# Patient Record
Sex: MALE | Race: WHITE | Employment: FULL TIME | URBAN - NONMETROPOLITAN AREA
[De-identification: names, ages, dates, MRNs, and addresses within clinical notes are randomized per-mention and may not be internally consistent; named-entity substitution may affect disease eponyms.]

---

## 2017-10-12 ENCOUNTER — HOSPITAL ENCOUNTER (INPATIENT)
Age: 57
LOS: 3 days | Discharge: HOME | DRG: 603 | End: 2017-10-16
Attending: EMERGENCY MEDICINE | Admitting: FAMILY MEDICINE
Payer: COMMERCIAL

## 2017-10-12 DIAGNOSIS — L03.116 CELLULITIS OF LEFT LOWER EXTREMITY: Primary | ICD-10-CM

## 2017-10-12 DIAGNOSIS — R23.3 PETECHIAE: ICD-10-CM

## 2017-10-12 PROCEDURE — 99284 EMERGENCY DEPT VISIT MOD MDM: CPT

## 2017-10-12 PROCEDURE — 83605 ASSAY OF LACTIC ACID: CPT

## 2017-10-12 PROCEDURE — 87040 BLOOD CULTURE FOR BACTERIA: CPT

## 2017-10-12 PROCEDURE — 85025 COMPLETE CBC W/AUTO DIFF WBC: CPT

## 2017-10-12 PROCEDURE — 80053 COMPREHEN METABOLIC PANEL: CPT

## 2017-10-12 RX ORDER — LIDOCAINE 50 MG/G
OINTMENT TOPICAL DAILY
COMMUNITY

## 2017-10-12 RX ORDER — ASPIRIN 325 MG
325 TABLET ORAL DAILY
Status: ON HOLD | COMMUNITY
End: 2017-10-16

## 2017-10-12 RX ORDER — GABAPENTIN 300 MG/1
300 CAPSULE ORAL NIGHTLY
COMMUNITY

## 2017-10-12 RX ORDER — AMOXICILLIN 500 MG
CAPSULE ORAL DAILY
COMMUNITY

## 2017-10-12 RX ORDER — MULTIVIT WITH MINERALS/LUTEIN
1 TABLET ORAL DAILY
COMMUNITY

## 2017-10-12 RX ORDER — NAPROXEN 500 MG/1
500 TABLET ORAL 2 TIMES DAILY WITH MEALS
Status: ON HOLD | COMMUNITY
End: 2017-10-16 | Stop reason: HOSPADM

## 2017-10-12 RX ORDER — LISINOPRIL 20 MG/1
20 TABLET ORAL DAILY
COMMUNITY

## 2017-10-12 ASSESSMENT — PAIN DESCRIPTION - LOCATION: LOCATION: FOOT

## 2017-10-12 ASSESSMENT — PAIN DESCRIPTION - PAIN TYPE: TYPE: ACUTE PAIN

## 2017-10-12 ASSESSMENT — PAIN SCALES - GENERAL: PAINLEVEL_OUTOF10: 10

## 2017-10-12 ASSESSMENT — PAIN DESCRIPTION - ORIENTATION: ORIENTATION: RIGHT;LEFT

## 2017-10-13 ENCOUNTER — APPOINTMENT (OUTPATIENT)
Dept: VASCULAR LAB | Age: 57
DRG: 603 | End: 2017-10-13
Payer: COMMERCIAL

## 2017-10-13 PROBLEM — G62.9 NEUROPATHY: Chronic | Status: ACTIVE | Noted: 2017-10-13

## 2017-10-13 PROBLEM — B35.3 TINEA PEDIS OF BOTH FEET: Status: ACTIVE | Noted: 2017-10-13

## 2017-10-13 PROBLEM — I10 ESSENTIAL HYPERTENSION: Chronic | Status: ACTIVE | Noted: 2017-10-13

## 2017-10-13 PROBLEM — L03.116 CELLULITIS OF LEFT LOWER LEG: Status: ACTIVE | Noted: 2017-10-13

## 2017-10-13 PROBLEM — R51.9 HEADACHE: Status: ACTIVE | Noted: 2017-10-13

## 2017-10-13 LAB
-: NORMAL
ABSOLUTE BANDS #: 1.46 K/UL (ref 0–1)
ABSOLUTE EOS #: 0.18 K/UL (ref 0–0.4)
ABSOLUTE LYMPH #: 0.73 K/UL (ref 1–4.8)
ABSOLUTE MONO #: 0 K/UL (ref 0–1)
ALBUMIN SERPL-MCNC: 4.2 G/DL (ref 3.5–5.2)
ALBUMIN/GLOBULIN RATIO: 1.5 (ref 1–2.5)
ALP BLD-CCNC: 63 U/L (ref 40–129)
ALT SERPL-CCNC: 48 U/L (ref 5–41)
AMORPHOUS: NORMAL
ANION GAP SERPL CALCULATED.3IONS-SCNC: 12 MMOL/L (ref 9–17)
ANION GAP SERPL CALCULATED.3IONS-SCNC: 14 MMOL/L (ref 9–17)
AST SERPL-CCNC: 29 U/L
BACTERIA: NORMAL
BANDS: 8 %
BASOPHILS # BLD: 1 %
BASOPHILS ABSOLUTE: 0.18 K/UL (ref 0–0.2)
BILIRUB SERPL-MCNC: 0.43 MG/DL (ref 0.3–1.2)
BILIRUBIN URINE: NEGATIVE
BUN BLDV-MCNC: 12 MG/DL (ref 6–20)
BUN BLDV-MCNC: 14 MG/DL (ref 6–20)
BUN/CREAT BLD: 13 (ref 9–20)
BUN/CREAT BLD: 14 (ref 9–20)
CALCIUM SERPL-MCNC: 8.6 MG/DL (ref 8.6–10.4)
CALCIUM SERPL-MCNC: 9 MG/DL (ref 8.6–10.4)
CASTS UA: NORMAL /LPF
CHLORIDE BLD-SCNC: 99 MMOL/L (ref 98–107)
CHLORIDE BLD-SCNC: 99 MMOL/L (ref 98–107)
CO2: 25 MMOL/L (ref 20–31)
CO2: 27 MMOL/L (ref 20–31)
COLOR: YELLOW
COMMENT UA: NORMAL
CREAT SERPL-MCNC: 0.96 MG/DL (ref 0.7–1.2)
CREAT SERPL-MCNC: 1.02 MG/DL (ref 0.7–1.2)
CRYSTALS, UA: NORMAL /HPF
D-DIMER QUANTITATIVE: 0.59 MG/L FEU (ref 0.19–0.5)
DIFFERENTIAL TYPE: ABNORMAL
DIRECT EXAM: NORMAL
EOSINOPHILS RELATIVE PERCENT: 1 %
EPITHELIAL CELLS UA: NORMAL /HPF (ref 0–5)
ESTIMATED AVERAGE GLUCOSE: 126 MG/DL
GFR AFRICAN AMERICAN: >60 ML/MIN
GFR AFRICAN AMERICAN: >60 ML/MIN
GFR NON-AFRICAN AMERICAN: >60 ML/MIN
GFR NON-AFRICAN AMERICAN: >60 ML/MIN
GFR SERPL CREATININE-BSD FRML MDRD: ABNORMAL ML/MIN/{1.73_M2}
GLUCOSE BLD-MCNC: 134 MG/DL (ref 74–100)
GLUCOSE BLD-MCNC: 141 MG/DL (ref 74–100)
GLUCOSE BLD-MCNC: 142 MG/DL (ref 70–99)
GLUCOSE BLD-MCNC: 154 MG/DL (ref 70–99)
GLUCOSE BLD-MCNC: 189 MG/DL (ref 74–100)
GLUCOSE URINE: NEGATIVE
HBA1C MFR BLD: 6 % (ref 4.8–5.9)
HCT VFR BLD CALC: 42.8 % (ref 41–53)
HCT VFR BLD CALC: 45.5 % (ref 41–53)
HEMOGLOBIN: 14.4 G/DL (ref 13.5–17)
HEMOGLOBIN: 15.3 G/DL (ref 13.5–17)
INR BLD: 1.1 (ref 0.9–1.2)
KETONES, URINE: NEGATIVE
LACTIC ACID: 1.7 MMOL/L (ref 0.5–2.2)
LEUKOCYTE ESTERASE, URINE: NEGATIVE
LYMPHOCYTES # BLD: 4 %
Lab: NORMAL
MCH RBC QN AUTO: 31 PG (ref 26–34)
MCH RBC QN AUTO: 31.2 PG (ref 26–34)
MCHC RBC AUTO-ENTMCNC: 33.6 G/DL (ref 31–37)
MCHC RBC AUTO-ENTMCNC: 33.7 G/DL (ref 31–37)
MCV RBC AUTO: 92.2 FL (ref 80–100)
MCV RBC AUTO: 92.5 FL (ref 80–100)
MONOCYTES # BLD: 0 %
MORPHOLOGY: NORMAL
MUCUS: NORMAL
NITRITE, URINE: NEGATIVE
OTHER OBSERVATIONS UA: NORMAL
PARTIAL THROMBOPLASTIN TIME: 27.4 SEC (ref 23.2–34.4)
PDW BLD-RTO: 13.8 % (ref 12.1–15.2)
PDW BLD-RTO: 13.9 % (ref 12.1–15.2)
PH UA: 7.5 (ref 5–9)
PLATELET # BLD: 153 K/UL (ref 140–450)
PLATELET # BLD: 162 K/UL (ref 140–450)
PLATELET ESTIMATE: ABNORMAL
PMV BLD AUTO: 8.6 FL (ref 6–12)
PMV BLD AUTO: 8.7 FL (ref 6–12)
POTASSIUM SERPL-SCNC: 4.3 MMOL/L (ref 3.7–5.3)
POTASSIUM SERPL-SCNC: 4.4 MMOL/L (ref 3.7–5.3)
PROTEIN UA: NEGATIVE
PROTHROMBIN TIME: 11 SEC (ref 9.7–12.2)
RBC # BLD: 4.63 M/UL (ref 4.5–5.9)
RBC # BLD: 4.93 M/UL (ref 4.5–5.9)
RBC # BLD: ABNORMAL 10*6/UL
RBC UA: NORMAL /HPF (ref 0–2)
RENAL EPITHELIAL, UA: NORMAL /HPF
SEG NEUTROPHILS: 86 %
SEGMENTED NEUTROPHILS ABSOLUTE COUNT: 15.75 K/UL (ref 1.8–7.7)
SODIUM BLD-SCNC: 138 MMOL/L (ref 135–144)
SODIUM BLD-SCNC: 138 MMOL/L (ref 135–144)
SPECIFIC GRAVITY UA: 1.01 (ref 1.01–1.02)
SPECIMEN DESCRIPTION: NORMAL
STATUS: NORMAL
TOTAL PROTEIN: 7 G/DL (ref 6.4–8.3)
TRICHOMONAS: NORMAL
TURBIDITY: CLEAR
URINE HGB: NEGATIVE
UROBILINOGEN, URINE: NORMAL
WBC # BLD: 14.1 K/UL (ref 3.5–11)
WBC # BLD: 18.3 K/UL (ref 3.5–11)
WBC # BLD: ABNORMAL 10*3/UL
WBC UA: NORMAL /HPF (ref 0–5)
YEAST: NORMAL

## 2017-10-13 PROCEDURE — 6360000002 HC RX W HCPCS: Performed by: PHYSICIAN ASSISTANT

## 2017-10-13 PROCEDURE — 87205 SMEAR GRAM STAIN: CPT

## 2017-10-13 PROCEDURE — 86403 PARTICLE AGGLUT ANTBDY SCRN: CPT

## 2017-10-13 PROCEDURE — 6370000000 HC RX 637 (ALT 250 FOR IP): Performed by: FAMILY MEDICINE

## 2017-10-13 PROCEDURE — 82947 ASSAY GLUCOSE BLOOD QUANT: CPT

## 2017-10-13 PROCEDURE — 1200000000 HC SEMI PRIVATE

## 2017-10-13 PROCEDURE — 2580000003 HC RX 258: Performed by: EMERGENCY MEDICINE

## 2017-10-13 PROCEDURE — 96375 TX/PRO/DX INJ NEW DRUG ADDON: CPT

## 2017-10-13 PROCEDURE — 85730 THROMBOPLASTIN TIME PARTIAL: CPT

## 2017-10-13 PROCEDURE — 85610 PROTHROMBIN TIME: CPT

## 2017-10-13 PROCEDURE — 85027 COMPLETE CBC AUTOMATED: CPT

## 2017-10-13 PROCEDURE — 80048 BASIC METABOLIC PNL TOTAL CA: CPT

## 2017-10-13 PROCEDURE — 6360000002 HC RX W HCPCS: Performed by: FAMILY MEDICINE

## 2017-10-13 PROCEDURE — 36415 COLL VENOUS BLD VENIPUNCTURE: CPT

## 2017-10-13 PROCEDURE — 93971 EXTREMITY STUDY: CPT

## 2017-10-13 PROCEDURE — 87077 CULTURE AEROBIC IDENTIFY: CPT

## 2017-10-13 PROCEDURE — 87075 CULTR BACTERIA EXCEPT BLOOD: CPT

## 2017-10-13 PROCEDURE — 6360000002 HC RX W HCPCS: Performed by: EMERGENCY MEDICINE

## 2017-10-13 PROCEDURE — 96365 THER/PROPH/DIAG IV INF INIT: CPT

## 2017-10-13 PROCEDURE — 81001 URINALYSIS AUTO W/SCOPE: CPT

## 2017-10-13 PROCEDURE — 96372 THER/PROPH/DIAG INJ SC/IM: CPT

## 2017-10-13 PROCEDURE — 85379 FIBRIN DEGRADATION QUANT: CPT

## 2017-10-13 PROCEDURE — 2580000003 HC RX 258: Performed by: FAMILY MEDICINE

## 2017-10-13 PROCEDURE — 87070 CULTURE OTHR SPECIMN AEROBIC: CPT

## 2017-10-13 PROCEDURE — 6370000000 HC RX 637 (ALT 250 FOR IP): Performed by: PHYSICIAN ASSISTANT

## 2017-10-13 PROCEDURE — 86618 LYME DISEASE ANTIBODY: CPT

## 2017-10-13 PROCEDURE — 87804 INFLUENZA ASSAY W/OPTIC: CPT

## 2017-10-13 PROCEDURE — 83036 HEMOGLOBIN GLYCOSYLATED A1C: CPT

## 2017-10-13 RX ORDER — MORPHINE SULFATE 10 MG/ML
2 INJECTION, SOLUTION INTRAMUSCULAR; INTRAVENOUS
Status: DISCONTINUED | OUTPATIENT
Start: 2017-10-13 | End: 2017-10-13

## 2017-10-13 RX ORDER — ONDANSETRON 2 MG/ML
4 INJECTION INTRAMUSCULAR; INTRAVENOUS EVERY 6 HOURS PRN
Status: DISCONTINUED | OUTPATIENT
Start: 2017-10-13 | End: 2017-10-16 | Stop reason: HOSPADM

## 2017-10-13 RX ORDER — TRAMADOL HYDROCHLORIDE 50 MG/1
100 TABLET ORAL EVERY 6 HOURS PRN
Status: DISCONTINUED | OUTPATIENT
Start: 2017-10-13 | End: 2017-10-16 | Stop reason: HOSPADM

## 2017-10-13 RX ORDER — LISINOPRIL 20 MG/1
20 TABLET ORAL DAILY
Status: DISCONTINUED | OUTPATIENT
Start: 2017-10-13 | End: 2017-10-16 | Stop reason: HOSPADM

## 2017-10-13 RX ORDER — SODIUM CHLORIDE 0.9 % (FLUSH) 0.9 %
10 SYRINGE (ML) INJECTION EVERY 12 HOURS SCHEDULED
Status: DISCONTINUED | OUTPATIENT
Start: 2017-10-13 | End: 2017-10-16 | Stop reason: HOSPADM

## 2017-10-13 RX ORDER — NICOTINE POLACRILEX 4 MG
15 LOZENGE BUCCAL PRN
Status: DISCONTINUED | OUTPATIENT
Start: 2017-10-13 | End: 2017-10-13

## 2017-10-13 RX ORDER — ACETAMINOPHEN 325 MG/1
650 TABLET ORAL EVERY 4 HOURS PRN
Status: DISCONTINUED | OUTPATIENT
Start: 2017-10-13 | End: 2017-10-16 | Stop reason: HOSPADM

## 2017-10-13 RX ORDER — GABAPENTIN 300 MG/1
300 CAPSULE ORAL 2 TIMES DAILY
Status: DISCONTINUED | OUTPATIENT
Start: 2017-10-13 | End: 2017-10-16 | Stop reason: HOSPADM

## 2017-10-13 RX ORDER — MORPHINE SULFATE 10 MG/ML
4 INJECTION, SOLUTION INTRAMUSCULAR; INTRAVENOUS ONCE
Status: COMPLETED | OUTPATIENT
Start: 2017-10-13 | End: 2017-10-13

## 2017-10-13 RX ORDER — DEXTROSE MONOHYDRATE 50 MG/ML
100 INJECTION, SOLUTION INTRAVENOUS PRN
Status: DISCONTINUED | OUTPATIENT
Start: 2017-10-13 | End: 2017-10-13

## 2017-10-13 RX ORDER — ASPIRIN 325 MG
325 TABLET ORAL DAILY
Status: DISCONTINUED | OUTPATIENT
Start: 2017-10-13 | End: 2017-10-16 | Stop reason: HOSPADM

## 2017-10-13 RX ORDER — GABAPENTIN 300 MG/1
300 CAPSULE ORAL NIGHTLY
Status: DISCONTINUED | OUTPATIENT
Start: 2017-10-13 | End: 2017-10-13

## 2017-10-13 RX ORDER — DEXTROSE MONOHYDRATE 25 G/50ML
12.5 INJECTION, SOLUTION INTRAVENOUS PRN
Status: DISCONTINUED | OUTPATIENT
Start: 2017-10-13 | End: 2017-10-13

## 2017-10-13 RX ORDER — MORPHINE SULFATE 10 MG/ML
4 INJECTION, SOLUTION INTRAMUSCULAR; INTRAVENOUS EVERY 4 HOURS PRN
Status: DISCONTINUED | OUTPATIENT
Start: 2017-10-13 | End: 2017-10-13 | Stop reason: ALTCHOICE

## 2017-10-13 RX ORDER — MORPHINE SULFATE 10 MG/ML
2 INJECTION, SOLUTION INTRAMUSCULAR; INTRAVENOUS
Status: DISCONTINUED | OUTPATIENT
Start: 2017-10-13 | End: 2017-10-16 | Stop reason: HOSPADM

## 2017-10-13 RX ORDER — SODIUM CHLORIDE 9 MG/ML
INJECTION, SOLUTION INTRAVENOUS CONTINUOUS
Status: DISCONTINUED | OUTPATIENT
Start: 2017-10-13 | End: 2017-10-16 | Stop reason: HOSPADM

## 2017-10-13 RX ORDER — SODIUM CHLORIDE 0.9 % (FLUSH) 0.9 %
10 SYRINGE (ML) INJECTION PRN
Status: DISCONTINUED | OUTPATIENT
Start: 2017-10-13 | End: 2017-10-16 | Stop reason: HOSPADM

## 2017-10-13 RX ORDER — FAMOTIDINE 20 MG/1
20 TABLET, FILM COATED ORAL 2 TIMES DAILY
Status: DISCONTINUED | OUTPATIENT
Start: 2017-10-13 | End: 2017-10-16 | Stop reason: HOSPADM

## 2017-10-13 RX ORDER — 0.9 % SODIUM CHLORIDE 0.9 %
1000 INTRAVENOUS SOLUTION INTRAVENOUS ONCE
Status: COMPLETED | OUTPATIENT
Start: 2017-10-13 | End: 2017-10-13

## 2017-10-13 RX ORDER — CLOTRIMAZOLE 1 %
CREAM (GRAM) TOPICAL 2 TIMES DAILY
Status: DISCONTINUED | OUTPATIENT
Start: 2017-10-13 | End: 2017-10-16 | Stop reason: HOSPADM

## 2017-10-13 RX ADMIN — MORPHINE SULFATE 4 MG: 10 INJECTION, SOLUTION INTRAMUSCULAR; INTRAVENOUS at 00:32

## 2017-10-13 RX ADMIN — FAMOTIDINE 20 MG: 20 TABLET ORAL at 20:07

## 2017-10-13 RX ADMIN — FAMOTIDINE 20 MG: 20 TABLET ORAL at 08:42

## 2017-10-13 RX ADMIN — TRAMADOL HYDROCHLORIDE 100 MG: 50 TABLET, FILM COATED ORAL at 15:27

## 2017-10-13 RX ADMIN — TRAMADOL HYDROCHLORIDE 100 MG: 50 TABLET, FILM COATED ORAL at 09:24

## 2017-10-13 RX ADMIN — ACETAMINOPHEN 650 MG: 325 TABLET, FILM COATED ORAL at 08:42

## 2017-10-13 RX ADMIN — SODIUM CHLORIDE: 9 INJECTION, SOLUTION INTRAVENOUS at 21:57

## 2017-10-13 RX ADMIN — ASPIRIN 325 MG: 325 TABLET, COATED ORAL at 08:42

## 2017-10-13 RX ADMIN — ENOXAPARIN SODIUM 40 MG: 40 INJECTION SUBCUTANEOUS at 08:43

## 2017-10-13 RX ADMIN — SODIUM CHLORIDE 1000 ML: 9 INJECTION, SOLUTION INTRAVENOUS at 00:30

## 2017-10-13 RX ADMIN — VANCOMYCIN HYDROCHLORIDE 1250 MG: 500 INJECTION, POWDER, LYOPHILIZED, FOR SOLUTION INTRAVENOUS at 10:15

## 2017-10-13 RX ADMIN — LISINOPRIL 20 MG: 20 TABLET ORAL at 08:42

## 2017-10-13 RX ADMIN — CEFTRIAXONE 1 G: 1 INJECTION, POWDER, FOR SOLUTION INTRAMUSCULAR; INTRAVENOUS at 01:11

## 2017-10-13 RX ADMIN — GABAPENTIN 300 MG: 300 CAPSULE ORAL at 09:24

## 2017-10-13 RX ADMIN — GABAPENTIN 300 MG: 300 CAPSULE ORAL at 20:07

## 2017-10-13 RX ADMIN — MORPHINE SULFATE 2 MG: 10 INJECTION, SOLUTION INTRAMUSCULAR; INTRAVENOUS at 03:04

## 2017-10-13 RX ADMIN — ENOXAPARIN SODIUM 30 MG: 30 INJECTION SUBCUTANEOUS at 20:07

## 2017-10-13 RX ADMIN — MORPHINE SULFATE 2 MG: 10 INJECTION, SOLUTION INTRAMUSCULAR; INTRAVENOUS at 08:43

## 2017-10-13 RX ADMIN — VANCOMYCIN HYDROCHLORIDE 1250 MG: 500 INJECTION, POWDER, LYOPHILIZED, FOR SOLUTION INTRAVENOUS at 19:03

## 2017-10-13 RX ADMIN — CLOTRIMAZOLE: 10 CREAM TOPICAL at 20:11

## 2017-10-13 RX ADMIN — DEXTROSE MONOHYDRATE 1750 MG: 50 INJECTION, SOLUTION INTRAVENOUS at 02:30

## 2017-10-13 RX ADMIN — ENOXAPARIN SODIUM 135 MG: 150 INJECTION SUBCUTANEOUS at 01:30

## 2017-10-13 RX ADMIN — SODIUM CHLORIDE: 9 INJECTION, SOLUTION INTRAVENOUS at 03:06

## 2017-10-13 RX ADMIN — TRAMADOL HYDROCHLORIDE 100 MG: 50 TABLET, FILM COATED ORAL at 21:57

## 2017-10-13 RX ADMIN — CLOTRIMAZOLE: 10 CREAM TOPICAL at 09:27

## 2017-10-13 ASSESSMENT — ENCOUNTER SYMPTOMS
VOMITING: 0
PHOTOPHOBIA: 0
FACIAL SWELLING: 0
ABDOMINAL PAIN: 0
VOICE CHANGE: 0
SORE THROAT: 0
SHORTNESS OF BREATH: 0
BLOOD IN STOOL: 0
NAUSEA: 0
BACK PAIN: 0
COUGH: 0
DIARRHEA: 0
TROUBLE SWALLOWING: 0
CHEST TIGHTNESS: 0
WHEEZING: 0

## 2017-10-13 ASSESSMENT — PAIN DESCRIPTION - FREQUENCY
FREQUENCY: CONTINUOUS
FREQUENCY: CONTINUOUS

## 2017-10-13 ASSESSMENT — PAIN SCALES - GENERAL
PAINLEVEL_OUTOF10: 10
PAINLEVEL_OUTOF10: 10
PAINLEVEL_OUTOF10: 7
PAINLEVEL_OUTOF10: 10
PAINLEVEL_OUTOF10: 5
PAINLEVEL_OUTOF10: 10
PAINLEVEL_OUTOF10: 6
PAINLEVEL_OUTOF10: 6
PAINLEVEL_OUTOF10: 10

## 2017-10-13 ASSESSMENT — PAIN DESCRIPTION - DESCRIPTORS
DESCRIPTORS_2: ACHING
DESCRIPTORS: ACHING;BURNING;CONSTANT;DISCOMFORT
DESCRIPTORS: ACHING;BURNING;CONSTANT;DISCOMFORT
DESCRIPTORS: BURNING;PINS AND NEEDLES

## 2017-10-13 ASSESSMENT — PAIN DESCRIPTION - ONSET
ONSET_2: ON-GOING
ONSET: ON-GOING
ONSET: ON-GOING

## 2017-10-13 ASSESSMENT — PAIN DESCRIPTION - ORIENTATION
ORIENTATION: RIGHT;LEFT
ORIENTATION: RIGHT;LEFT
ORIENTATION: LEFT;RIGHT

## 2017-10-13 ASSESSMENT — PAIN DESCRIPTION - PAIN TYPE
TYPE: ACUTE PAIN
TYPE_2: ACUTE PAIN

## 2017-10-13 ASSESSMENT — PAIN DESCRIPTION - LOCATION
LOCATION: FOOT;LEG
LOCATION: BACK;FOOT
LOCATION: HEAD;BACK
LOCATION: BACK;FOOT
LOCATION_2: OTHER (COMMENT)

## 2017-10-13 ASSESSMENT — PAIN DESCRIPTION - INTENSITY: RATING_2: 6

## 2017-10-13 ASSESSMENT — PAIN DESCRIPTION - DURATION: DURATION_2: CONTINUOUS

## 2017-10-13 NOTE — H&P
500 MG tablet Take 500 mg by mouth 2 times daily (with meals)   Yes Historical Provider, MD   diclofenac sodium 1 % GEL Apply 2 g topically 4 times daily as needed for Pain   Yes Historical Provider, MD   lidocaine (XYLOCAINE) 5 % ointment Apply topically daily Apply topically as needed. Yes Historical Provider, MD   Omega-3 Fatty Acids (FISH OIL) 1200 MG CAPS Take by mouth daily   Yes Historical Provider, MD   Psyllium (METAMUCIL) 28.3 % POWD Take by mouth daily   Yes Historical Provider, MD   aspirin 325 MG tablet Take 325 mg by mouth daily   Yes Historical Provider, MD   Multiple Vitamins-Minerals (CENTRUM SILVER) TABS Take 1 tablet by mouth daily   Yes Historical Provider, MD       Review of Systems:  Constitutional:negative  for fevers, and negative for chills. Eyes: negative for visual disturbance   ENT: negative for sore throat, negative nasal congestion, and negative for earache  Respiratory: negative for shortness of breath, negative for cough, and negative for wheezing  Cardiovascular: negative for chest pain, negative for palpitations, and negative for syncope  Gastrointestinal: negative for abdominal pain, negative for nausea,negative for vomiting, negative for diarrhea, negative for constipation, and negative for hematochezia or melena  Genitourinary: negative for dysuria, negative for urinary urgency, negative for urinary frequency, and negative for hematuria  Skin: positive for skin rash, and positive for skin lesions  Neurological: negative for unilateral weakness, numbness or tingling.     Physical Exam:    Vitals:   Temp: 98.6 °F (37 °C)  BP: (!) 148/83  Resp: 18  Pulse: 92  SpO2: 96 %  24HR INTAKE/OUTPUT:  No intake or output data in the 24 hours ending 10/13/17 0920    Exam:  GEN:  alert and oriented to person, place and time, well-developed and well-nourished, in no acute distress  EYES: PERRL  NECK: normal, supple,  no carotid bruits  PULM: clear to auscultation bilaterally- no wheezes, rales

## 2017-10-13 NOTE — ED PROVIDER NOTES
is a 4 cm x 3 cm patch of purpura which is present. No desquamation, negative Nikolsky's, no peeling of the skin   Lymphadenopathy:     He has no cervical adenopathy. Neurological: He is alert and oriented to person, place, and time. No cranial nerve deficit. He exhibits normal muscle tone. Coordination normal.   No nystagmus   Skin: Skin is warm and dry. No rash noted. He is not diaphoretic. No erythema. No pallor. Psychiatric: He has a normal mood and affect. His behavior is normal. Thought content normal.   Nursing note and vitals reviewed. DIAGNOSTIC RESULTS     EKG: (none if blank)  All EKG's are interpreted by the Emergency Department Physician who either signs or Co-signs this chart in the absence of a cardiologist.        RADIOLOGY: (none if blank)   Interpretation per the Radiologist below, if available at the time of this note:    VL DUP LOWER EXTREMITY VENOUS LEFT    (Results Pending)       LABS:  Labs Reviewed   CBC WITH AUTO DIFFERENTIAL - Abnormal; Notable for the following:        Result Value    WBC 18.3 (*)     All other components within normal limits   COMPREHENSIVE METABOLIC PANEL - Abnormal; Notable for the following:     Glucose 142 (*)     ALT 48 (*)     All other components within normal limits   D-DIMER, QUANTITATIVE - Abnormal; Notable for the following:     D-Dimer, Quant 0.59 (*)     All other components within normal limits   RAPID INFLUENZA A/B ANTIGENS   CULTURE BLOOD #1   CULTURE BLOOD #1   LACTIC ACID   UA W/REFLEX CULTURE   PROTIME-INR   APTT   MICROSCOPIC URINALYSIS   LYME AB       All other labs were within normal range or not returned as of this dictation. EMERGENCY DEPARTMENT COURSE and Medical Decision Making:     MDM/  ED Course    Petechiae and purpura noted and several patches of the dependent portion of the feet. Likely secondary to swelling. I doubt RMSF or meningococcus.   The patient has not had any rapid spread or progression during his several hours in the ED. I'm concerned about possibility of cellulitis in the left lower extremity versus a DVT. His d-dimer is slightly elevated. He will need a Doppler ultrasound to rule out a DVT as he is a . We'll cover him for Lovenox and now and admit for IV antibiotics and Doppler ultrasound to be done in several hours as venous Doppler is unavailable at this facility overnight. The case is discussed with Dr. Kalyn Roy, hospitalist on call who agrees to admit the patient. All pertinent issues noted above were discussed with him. He agreed to follow-up on Doppler results as well. CONSULTS: (None if blank)  None    Procedures: (None if blank)       CLINICAL IMPRESSION      1. Cellulitis of left lower extremity    2. Petechiae          DISPOSITION/PLAN   DISPOSITION Decision to Admit    PATIENT REFERRED TO:  No follow-up provider specified.     DISCHARGE MEDICATIONS:  New Prescriptions    No medications on file              (Please note that portions of this note were completed with a voice recognition program.  Efforts were made to edit the dictations but occasionally words are mis-transcribed.)      Thompson Neely DO (electronically signed)  Attending Emergency Physician         Lizbeth Summers DO  10/13/17 0123

## 2017-10-13 NOTE — PROGRESS NOTES
Pharmacy Note  Vancomycin Consult    Pascual Ricketts is a 61 y.o. male started on Vancomycin for cellulitis; consult received from Dr. Brad Farfan to manage therapy. There is no problem list on file for this patient. Allergies:  Review of patient's allergies indicates no known allergies. Temp max: 99.8 F    Recent Labs      10/12/17   0003   BUN  14       Recent Labs      10/12/17   0003   CREATININE  1.02       Recent Labs      10/12/17   0003   WBC  18.3*       No intake or output data in the 24 hours ending 10/13/17 3006    Culture Date      Source                       Results   Pending    Ht Readings from Last 1 Encounters:   10/13/17 5' 11\" (1.803 m)        Wt Readings from Last 1 Encounters:   10/13/17 (!) 307 lb (139.3 kg)         Body mass index is 42.82 kg/m². Estimated Creatinine Clearance: 113 mL/min (based on SCr of 1.02 mg/dL). Assessment/Plan:  Will initiate vancomycin 1250 mg IV every 8 hours. Timing of trough level will be determined based on culture results, renal function, and clinical response. Thank you for the consult. Will continue to follow.     Memo Milton, PharmD  10/13/2017 3:18 AM

## 2017-10-13 NOTE — ED NOTES
Writer spoke with Brian Gonzalez in xray about setting up time for ultrasound, she stated \"when she gets here tomorrow morning she'll probably do it first or she will have to fit it inbetween other scans\".      Isabell Langston RN  10/13/17 4042

## 2017-10-13 NOTE — PROGRESS NOTES
Nutrition Assessment    Type and Reason for Visit: Initial    Nutrition Recommendations:  Increased use of whole fruits and vegetables    Malnutrition Assessment:  · Malnutrition Status: No malnutrition  · Context: Acute illness or injury  · Findings of the 6 clinical characteristics of malnutrition (Minimum of 2 out of 6 clinical characteristics is required to make the diagnosis of moderate or severe Protein Calorie Malnutrition based on AND/ASPEN Guidelines):  1. Energy Intake-Greater than 75%,      2. Weight Loss-No significant weight loss,    3. Fat Loss-No significant subcutaneous fat loss,    4. Muscle Loss-No significant muscle mass loss,    5. Fluid Accumulation-Mild fluid accumulation, Extremities  6.  Strength-Not measured    Nutrition Diagnosis:   · Problem: Overweight/Obese  · Etiology: related to Other (oral intakes greater than needs)     Signs and symptoms:  as evidenced by BMI (>40)    Nutrition Assessment:  · Subjective Assessment: Reports that he needs to lose weight. Likes fruits and vegetables. Quit smoking 1.5 years ago.    · Wound Type:  None  · Current Nutrition Therapies:  · Oral Diet Orders: General   · Oral Diet intake: % (observed)  · Oral Nutrition Supplement (ONS) Orders:    · Anthropometric Measures:  · Ht: 5' 11\" (180.3 cm)   · Current Body Wt: 307 lb (139.3 kg)  · BMI Classification: BMI > or equal to 40.0 Obese Class III    Lab Results   Component Value Date     10/13/2017    K 4.4 10/13/2017    CL 99 10/13/2017    CO2 27 10/13/2017    BUN 12 10/13/2017    CREATININE 0.96 10/13/2017    GLUCOSE 154 (H) 10/13/2017    CALCIUM 8.6 10/13/2017    PROT 7.0 10/12/2017    LABALBU 4.2 10/12/2017    BILITOT 0.43 10/12/2017    ALKPHOS 63 10/12/2017    AST 29 10/12/2017    ALT 48 (H) 10/12/2017    LABGLOM >60 10/13/2017    GFRAA >60 10/13/2017     Lab Results   Component Value Date    LABA1C 6.0 (H) 10/13/2017     Lab Results   Component Value Date     10/13/2017 Estimated Intake vs Estimated Needs: Intake Meets Needs    Nutrition Risk Level: Low, Moderate    No diabetes history, with A1c suggesting PreDM/IFG. Denying needs for any sort of education at present, even though identifying that he needs to lose weight. Will attach some basic education materials regarding weight loss to d/c instructions.      Nutrition Interventions:   Continue current diet  Continued Inpatient Monitoring, Education Initiated, Coordination of Care    Nutrition Evaluation:   · Evaluation: Goals set   · Goals: PO >75% meals     · Monitoring: Meal Intake, Pertinent Labs, Weight    Electronically signed by Sushma Gomes RD, LD on 10/13/17 at 12:41 PM    Contact Number: [phone]

## 2017-10-14 LAB
ANION GAP SERPL CALCULATED.3IONS-SCNC: 10 MMOL/L (ref 9–17)
BUN BLDV-MCNC: 12 MG/DL (ref 6–20)
BUN/CREAT BLD: 14 (ref 9–20)
CALCIUM SERPL-MCNC: 8.4 MG/DL (ref 8.6–10.4)
CHLORIDE BLD-SCNC: 100 MMOL/L (ref 98–107)
CO2: 29 MMOL/L (ref 20–31)
CREAT SERPL-MCNC: 0.84 MG/DL (ref 0.7–1.2)
GFR AFRICAN AMERICAN: >60 ML/MIN
GFR NON-AFRICAN AMERICAN: >60 ML/MIN
GFR SERPL CREATININE-BSD FRML MDRD: ABNORMAL ML/MIN/{1.73_M2}
GFR SERPL CREATININE-BSD FRML MDRD: ABNORMAL ML/MIN/{1.73_M2}
GLUCOSE BLD-MCNC: 131 MG/DL (ref 74–100)
GLUCOSE BLD-MCNC: 146 MG/DL (ref 70–99)
GLUCOSE BLD-MCNC: 164 MG/DL (ref 74–100)
HCT VFR BLD CALC: 43.2 % (ref 41–53)
HEMOGLOBIN: 14.4 G/DL (ref 13.5–17)
MCH RBC QN AUTO: 30.8 PG (ref 26–34)
MCHC RBC AUTO-ENTMCNC: 33.2 G/DL (ref 31–37)
MCV RBC AUTO: 92.8 FL (ref 80–100)
PDW BLD-RTO: 13.9 % (ref 12.1–15.2)
PLATELET # BLD: 151 K/UL (ref 140–450)
PMV BLD AUTO: 8.3 FL (ref 6–12)
POTASSIUM SERPL-SCNC: 4.5 MMOL/L (ref 3.7–5.3)
RBC # BLD: 4.66 M/UL (ref 4.5–5.9)
SODIUM BLD-SCNC: 139 MMOL/L (ref 135–144)
VANCOMYCIN TROUGH DATE LAST DOSE: NORMAL
VANCOMYCIN TROUGH DOSE AMOUNT: NORMAL
VANCOMYCIN TROUGH TIME LAST DOSE: NORMAL
VANCOMYCIN TROUGH: 11.7 UG/ML (ref 10–20)
WBC # BLD: 7.6 K/UL (ref 3.5–11)

## 2017-10-14 PROCEDURE — 80048 BASIC METABOLIC PNL TOTAL CA: CPT

## 2017-10-14 PROCEDURE — 6370000000 HC RX 637 (ALT 250 FOR IP): Performed by: PHYSICIAN ASSISTANT

## 2017-10-14 PROCEDURE — 85027 COMPLETE CBC AUTOMATED: CPT

## 2017-10-14 PROCEDURE — 1200000000 HC SEMI PRIVATE

## 2017-10-14 PROCEDURE — 2580000003 HC RX 258: Performed by: FAMILY MEDICINE

## 2017-10-14 PROCEDURE — 6360000002 HC RX W HCPCS: Performed by: PHYSICIAN ASSISTANT

## 2017-10-14 PROCEDURE — 82947 ASSAY GLUCOSE BLOOD QUANT: CPT

## 2017-10-14 PROCEDURE — 80202 ASSAY OF VANCOMYCIN: CPT

## 2017-10-14 PROCEDURE — 6360000002 HC RX W HCPCS: Performed by: FAMILY MEDICINE

## 2017-10-14 PROCEDURE — 36415 COLL VENOUS BLD VENIPUNCTURE: CPT

## 2017-10-14 PROCEDURE — 6370000000 HC RX 637 (ALT 250 FOR IP): Performed by: FAMILY MEDICINE

## 2017-10-14 RX ADMIN — VANCOMYCIN HYDROCHLORIDE 1250 MG: 500 INJECTION, POWDER, LYOPHILIZED, FOR SOLUTION INTRAVENOUS at 02:57

## 2017-10-14 RX ADMIN — MAGNESIUM HYDROXIDE 30 ML: 400 SUSPENSION ORAL at 13:05

## 2017-10-14 RX ADMIN — FAMOTIDINE 20 MG: 20 TABLET ORAL at 20:29

## 2017-10-14 RX ADMIN — ONDANSETRON 4 MG: 2 INJECTION INTRAMUSCULAR; INTRAVENOUS at 08:27

## 2017-10-14 RX ADMIN — GABAPENTIN 300 MG: 300 CAPSULE ORAL at 08:31

## 2017-10-14 RX ADMIN — ENOXAPARIN SODIUM 30 MG: 30 INJECTION SUBCUTANEOUS at 08:32

## 2017-10-14 RX ADMIN — FAMOTIDINE 20 MG: 20 TABLET ORAL at 08:31

## 2017-10-14 RX ADMIN — LISINOPRIL 20 MG: 20 TABLET ORAL at 08:31

## 2017-10-14 RX ADMIN — ACETAMINOPHEN 650 MG: 325 TABLET, FILM COATED ORAL at 17:09

## 2017-10-14 RX ADMIN — CLOTRIMAZOLE: 10 CREAM TOPICAL at 08:33

## 2017-10-14 RX ADMIN — ACETAMINOPHEN 650 MG: 325 TABLET, FILM COATED ORAL at 08:31

## 2017-10-14 RX ADMIN — ACETAMINOPHEN 650 MG: 325 TABLET, FILM COATED ORAL at 13:05

## 2017-10-14 RX ADMIN — CLOTRIMAZOLE: 10 CREAM TOPICAL at 20:32

## 2017-10-14 RX ADMIN — TRAMADOL HYDROCHLORIDE 100 MG: 50 TABLET, FILM COATED ORAL at 20:28

## 2017-10-14 RX ADMIN — ENOXAPARIN SODIUM 30 MG: 30 INJECTION SUBCUTANEOUS at 20:29

## 2017-10-14 RX ADMIN — VANCOMYCIN HYDROCHLORIDE 1250 MG: 500 INJECTION, POWDER, LYOPHILIZED, FOR SOLUTION INTRAVENOUS at 11:19

## 2017-10-14 RX ADMIN — TRAMADOL HYDROCHLORIDE 100 MG: 50 TABLET, FILM COATED ORAL at 14:04

## 2017-10-14 RX ADMIN — TRAMADOL HYDROCHLORIDE 100 MG: 50 TABLET, FILM COATED ORAL at 07:42

## 2017-10-14 RX ADMIN — VANCOMYCIN HYDROCHLORIDE 1250 MG: 500 INJECTION, POWDER, LYOPHILIZED, FOR SOLUTION INTRAVENOUS at 18:52

## 2017-10-14 RX ADMIN — ONDANSETRON 4 MG: 2 INJECTION INTRAMUSCULAR; INTRAVENOUS at 02:43

## 2017-10-14 RX ADMIN — ACETAMINOPHEN 650 MG: 325 TABLET, FILM COATED ORAL at 23:21

## 2017-10-14 RX ADMIN — ACETAMINOPHEN 650 MG: 325 TABLET, FILM COATED ORAL at 02:47

## 2017-10-14 RX ADMIN — SODIUM CHLORIDE: 9 INJECTION, SOLUTION INTRAVENOUS at 18:51

## 2017-10-14 RX ADMIN — ASPIRIN 325 MG: 325 TABLET, COATED ORAL at 08:31

## 2017-10-14 RX ADMIN — GABAPENTIN 300 MG: 300 CAPSULE ORAL at 20:29

## 2017-10-14 ASSESSMENT — PAIN DESCRIPTION - DESCRIPTORS
DESCRIPTORS: ACHING;BURNING
DESCRIPTORS_2: HEADACHE
DESCRIPTORS_2: HEADACHE
DESCRIPTORS: HEADACHE

## 2017-10-14 ASSESSMENT — PAIN DESCRIPTION - INTENSITY
RATING_2: 1
RATING_2: 7
RATING_2: 10

## 2017-10-14 ASSESSMENT — PAIN DESCRIPTION - FREQUENCY
FREQUENCY: CONTINUOUS
FREQUENCY: INTERMITTENT

## 2017-10-14 ASSESSMENT — PAIN DESCRIPTION - PAIN TYPE
TYPE: ACUTE PAIN
TYPE_2: ACUTE PAIN
TYPE: ACUTE PAIN
TYPE_2: ACUTE PAIN

## 2017-10-14 ASSESSMENT — PAIN SCALES - GENERAL
PAINLEVEL_OUTOF10: 4
PAINLEVEL_OUTOF10: 2
PAINLEVEL_OUTOF10: 6
PAINLEVEL_OUTOF10: 5
PAINLEVEL_OUTOF10: 1
PAINLEVEL_OUTOF10: 5
PAINLEVEL_OUTOF10: 3
PAINLEVEL_OUTOF10: 3
PAINLEVEL_OUTOF10: 5
PAINLEVEL_OUTOF10: 0
PAINLEVEL_OUTOF10: 3
PAINLEVEL_OUTOF10: 3

## 2017-10-14 ASSESSMENT — PAIN DESCRIPTION - ORIENTATION
ORIENTATION: ANTERIOR
ORIENTATION: RIGHT;LEFT
ORIENTATION_2: ANTERIOR
ORIENTATION_2: ANTERIOR
ORIENTATION: RIGHT;LEFT

## 2017-10-14 ASSESSMENT — PAIN DESCRIPTION - LOCATION
LOCATION_2: HEAD
LOCATION: HEAD
LOCATION: FOOT
LOCATION: HEAD
LOCATION: FOOT
LOCATION: FOOT
LOCATION_2: HEAD
LOCATION: FOOT
LOCATION: FOOT

## 2017-10-14 ASSESSMENT — PAIN DESCRIPTION - DURATION
DURATION_2: CONTINUOUS
DURATION_2: CONTINUOUS

## 2017-10-14 ASSESSMENT — PAIN DESCRIPTION - ONSET: ONSET: ON-GOING

## 2017-10-14 NOTE — PLAN OF CARE
Problem: Pain:  Goal: Control of acute pain  Control of acute pain   Outcome: Ongoing  Patient's overall pain kept manageable at 4-5/10 throughout shift with Tylenol and Ultram. Morphine available if pain increases. Problem: Falls - Risk of  Goal: Absence of falls  Outcome: Ongoing  Armband and falling star continued. Patient is independent in room. Encouraged to call for assistance as needed for safety. Problem: SAFETY  Goal: Free from accidental physical injury  Outcome: Ongoing  Patient remains free from injury. Call light in reach, bed in lowest position, clear pathway throughout room. Patient is A&Ox3 and has demonstrated ability to safely maneuver in room by self. Problem: SKIN INTEGRITY  Goal: Skin integrity is maintained or improved  Outcome: Ongoing  Patient has redness/cracking to toes; cream being applied BID. Improvement in cellulitic areas noted. BLE elevated on pillow. Patient is able to turn and reposition self. Hygiene is WNLs. Problem: Body Temperature - Imbalanced:  Goal: Ability to maintain a body temperature in the normal range will improve  Ability to maintain a body temperature in the normal range will improve  Outcome: Ongoing  Patient is afebrile at this time. Some diaphoresis and n/v in am, but improved throughout shift. Patient currently receiving IV ATB for cellulitis. Problem: Mobility - Impaired:  Goal: Mobility will improve to maximum level  Mobility will improve to maximum level  Outcome: Ongoing  Patient experiencing some pain with ambulation, but this does not affect his ability to safely ambulate frequently in room.

## 2017-10-14 NOTE — PROGRESS NOTES
[facility] 89 Vancomycin Consult:     Vancomycin Day: 2  Current Dosin mg IV every 8 hours    Temp max:  afebrile (past 24 hours)    Recent Labs      10/12/17   0003  10/13/17   0718  10/14/17   0549   CREATININE  1.02  0.96  0.84     Estimated Creatinine Clearance: 136 mL/min (based on SCr of 0.84 mg/dL). Recent Labs      10/12/17   0003  10/13/17   0718  10/14/17   0549   WBC  18.3*  14.1*  7.6       Culture Date      Source                Results  10/12/17             Blood #1 & #2     No growth x 6 & 7 hours, respectively   10/12/17             Foot                     N/A    Trough: 11.7 mg/L (goal=10-20 mg/L)    Assessment/Plan: Continue current dosing of vancomycin 1250 mg IV every 8 hours. Please draw vancomycin trough on 10/18/17 at 10:30 am (one-half hour before dose is due) if patient remains on vancomycin. Thanks!     Brenna Jean, Pharm D, 10/14/2017, 11:11 AM

## 2017-10-14 NOTE — PLAN OF CARE
Problem: Pain:  Goal: Pain level will decrease  Pain level will decrease   Outcome: Ongoing  Assess patients pain every four hours and as needed using the 1-10 pain scale. Patient encouraged to reposition every two hours and as needed. Medications as prescribed by physician. Alternatives to pain medications provided as tolerated. Will continue to monitor. Problem: Falls - Risk of  Goal: Absence of falls  Outcome: Ongoing  Patient is alert and oriented and has demonstrated the use of using the call light for assistance before getting up. Education has been provided to defer the use of the bed alarm and/or restraint free alarm as the patient has shown competency in calling for assistance and verbalizing the risk. Falling star program in use with fall band in place. Non skid socks are worn by patient. Call light, and bed side table along with personal belongings are within reach of patient. Will continue to monitor. Problem: DAILY CARE  Goal: Daily care needs are met  Outcome: Ongoing  Daily care needs are met by patient independently. Patient knows to use call light when in need of assistance. Problem: SKIN INTEGRITY  Goal: Skin integrity is maintained or improved  Outcome: Ongoing  Patient independent in repositioning. Skin kept clean and dry and is intact at this time. Monitoring for skin changes every shift and as needed. Redness noted to RLE and redness, cracked dry skin noted to creases of toes/feet. No new open areas on skin or in mucous membranes noted at this time, will continue to monitor.

## 2017-10-14 NOTE — PROGRESS NOTES
Patient did have an emesis about 15 minutes after receiving Zofran. He feels better now, and would like to try and sleep.

## 2017-10-14 NOTE — PROGRESS NOTES
Progress Note    SUBJECTIVE:  Patient seen for cellulitis with pain LLE. Reports pain level has decreased and is now a 5/10. ROS:   Constitutional: negative  for fevers, and negative for chills. Respiratory: negative for shortness of breath, negative for cough, and negative for wheezing  Cardiovascular: negative for chest pain, and negative for palpitations  Gastrointestinal: negative for abdominal pain, positive for nausea,negative for vomiting, negative for diarrhea, and positive for constipation     All other systems were reviewed with the patient and are negative unless otherwise stated in HPI    OBJECTIVE:    EXAM:  Vitals:    10/14/17 0728   BP: (!) 142/86   Pulse: 85   Resp: 16   Temp: 97.1 °F (36.2 °C)   SpO2: 95%      Weight: (!) 303 lb 4.8 oz (137.6 kg)    Height: 5' 11\" (180.3 cm)     GEN:   A & O x3, no apparent distress  EYES: No gross abnormalities.   NECK: normal, supple, no lymphadenopathy,   PULM: clear to auscultation bilaterally- no wheezes, rales or rhonchi, normal air movement, no respiratory distress  COR: regular rate & rhythm, no murmurs and no gallops  ABD:  soft, non-tender, non-distended, normal bowel sounds, no masses or organomegaly  EXT:   LLE--still red on shin and calf, warm, tender  NEURO: negative  SKIN:  See above    microbiology: blood culture: negative and wound culture: negative    CBC with Differential:    Lab Results   Component Value Date    WBC 7.6 10/14/2017    RBC 4.66 10/14/2017    HGB 14.4 10/14/2017    HCT 43.2 10/14/2017     10/14/2017    MCV 92.8 10/14/2017    MCH 30.8 10/14/2017    MCHC 33.2 10/14/2017    RDW 13.9 10/14/2017    LYMPHOPCT 4 10/12/2017    MONOPCT 0 10/12/2017    BASOPCT 1 10/12/2017    MONOSABS 0.00 10/12/2017    LYMPHSABS 0.73 10/12/2017    EOSABS 0.18 10/12/2017    BASOSABS 0.18 10/12/2017    DIFFTYPE NOT REPORTED 10/12/2017           Vl Dup Lower Extremity Venous Left    Result Date: 10/13/2017    [facility] +------------------------------------+----------+---------------+----------+ ! Location                            ! Visualized! Compressibility! Thrombosis! +------------------------------------+----------+---------------+----------+ ! Common Femoral                      !Yes       ! Yes            ! None      ! +------------------------------------+----------+---------------+----------+ ! Prox Femoral                        !Yes       ! Yes            ! None      ! +------------------------------------+----------+---------------+----------+ ! Mid Femoral                         !Yes       ! Yes            ! None      ! +------------------------------------+----------+---------------+----------+ ! Dist Femoral                        !Yes       ! Yes            ! None      ! +------------------------------------+----------+---------------+----------+ ! Deep Femoral                        !Yes       ! Yes            ! None      ! +------------------------------------+----------+---------------+----------+ ! Popliteal                           !Yes       ! Yes            ! None      ! +------------------------------------+----------+---------------+----------+ ! Sapheno Femoral Junction            ! Yes       ! Yes            ! None      ! +------------------------------------+----------+---------------+----------+ ! PTV                                 ! Yes       ! Yes            ! None      ! +------------------------------------+----------+---------------+----------+ ! Peroneal                            !Yes       ! Yes            ! None      ! +------------------------------------+----------+---------------+----------+ ! Gastroc                             ! Yes       ! Yes            ! None      ! +------------------------------------+----------+---------------+----------+ ! GSV Thigh                           ! Yes       ! Yes            ! None      ! +------------------------------------+----------+---------------+----------+ ! GSV Knee Headache      All Problems:  Patient Active Problem List   Diagnosis    Tinea pedis of both feet    Neuropathy     Cellulitis of left lower leg    Essential hypertension    Headache       PLAN:  · Continue IV vanco    HIGH RISK MEDS: Kierra Montes M.D.

## 2017-10-14 NOTE — PROGRESS NOTES
Please draw vancomycin trough at 10:30 am on 10/14/17 (one-half hour before dose is due). Thanks!   RICK/Kerry, Pharm D, 10/14/2017, 7:37 AM

## 2017-10-15 LAB
ANION GAP SERPL CALCULATED.3IONS-SCNC: 10 MMOL/L (ref 9–17)
BUN BLDV-MCNC: 13 MG/DL (ref 6–20)
BUN/CREAT BLD: 15 (ref 9–20)
CALCIUM SERPL-MCNC: 8.1 MG/DL (ref 8.6–10.4)
CHLORIDE BLD-SCNC: 97 MMOL/L (ref 98–107)
CO2: 28 MMOL/L (ref 20–31)
CREAT SERPL-MCNC: 0.89 MG/DL (ref 0.7–1.2)
GFR AFRICAN AMERICAN: >60 ML/MIN
GFR NON-AFRICAN AMERICAN: >60 ML/MIN
GFR SERPL CREATININE-BSD FRML MDRD: ABNORMAL ML/MIN/{1.73_M2}
GFR SERPL CREATININE-BSD FRML MDRD: ABNORMAL ML/MIN/{1.73_M2}
GLUCOSE BLD-MCNC: 109 MG/DL (ref 74–100)
GLUCOSE BLD-MCNC: 120 MG/DL (ref 74–100)
GLUCOSE BLD-MCNC: 137 MG/DL (ref 70–99)
HCT VFR BLD CALC: 41.9 % (ref 41–53)
HEMOGLOBIN: 14.1 G/DL (ref 13.5–17)
MCH RBC QN AUTO: 31.1 PG (ref 26–34)
MCHC RBC AUTO-ENTMCNC: 33.6 G/DL (ref 31–37)
MCV RBC AUTO: 92.4 FL (ref 80–100)
PDW BLD-RTO: 13.8 % (ref 12.1–15.2)
PLATELET # BLD: 167 K/UL (ref 140–450)
PMV BLD AUTO: 8.3 FL (ref 6–12)
POTASSIUM SERPL-SCNC: 4.3 MMOL/L (ref 3.7–5.3)
RBC # BLD: 4.54 M/UL (ref 4.5–5.9)
SODIUM BLD-SCNC: 135 MMOL/L (ref 135–144)
WBC # BLD: 7.2 K/UL (ref 3.5–11)

## 2017-10-15 PROCEDURE — 80048 BASIC METABOLIC PNL TOTAL CA: CPT

## 2017-10-15 PROCEDURE — 2580000003 HC RX 258: Performed by: FAMILY MEDICINE

## 2017-10-15 PROCEDURE — 6370000000 HC RX 637 (ALT 250 FOR IP): Performed by: PHYSICIAN ASSISTANT

## 2017-10-15 PROCEDURE — 6360000002 HC RX W HCPCS: Performed by: FAMILY MEDICINE

## 2017-10-15 PROCEDURE — 85027 COMPLETE CBC AUTOMATED: CPT

## 2017-10-15 PROCEDURE — 6360000002 HC RX W HCPCS: Performed by: PHYSICIAN ASSISTANT

## 2017-10-15 PROCEDURE — 36415 COLL VENOUS BLD VENIPUNCTURE: CPT

## 2017-10-15 PROCEDURE — 1200000000 HC SEMI PRIVATE

## 2017-10-15 PROCEDURE — 6370000000 HC RX 637 (ALT 250 FOR IP): Performed by: FAMILY MEDICINE

## 2017-10-15 PROCEDURE — 82947 ASSAY GLUCOSE BLOOD QUANT: CPT

## 2017-10-15 RX ADMIN — GABAPENTIN 300 MG: 300 CAPSULE ORAL at 09:39

## 2017-10-15 RX ADMIN — CLOTRIMAZOLE: 10 CREAM TOPICAL at 09:43

## 2017-10-15 RX ADMIN — VANCOMYCIN HYDROCHLORIDE 1250 MG: 500 INJECTION, POWDER, LYOPHILIZED, FOR SOLUTION INTRAVENOUS at 18:50

## 2017-10-15 RX ADMIN — MORPHINE SULFATE 2 MG: 10 INJECTION, SOLUTION INTRAMUSCULAR; INTRAVENOUS at 07:54

## 2017-10-15 RX ADMIN — TRAMADOL HYDROCHLORIDE 100 MG: 50 TABLET, FILM COATED ORAL at 23:05

## 2017-10-15 RX ADMIN — LISINOPRIL 20 MG: 20 TABLET ORAL at 09:39

## 2017-10-15 RX ADMIN — FAMOTIDINE 20 MG: 20 TABLET ORAL at 09:39

## 2017-10-15 RX ADMIN — ACETAMINOPHEN 650 MG: 325 TABLET, FILM COATED ORAL at 13:08

## 2017-10-15 RX ADMIN — TRAMADOL HYDROCHLORIDE 100 MG: 50 TABLET, FILM COATED ORAL at 03:20

## 2017-10-15 RX ADMIN — VANCOMYCIN HYDROCHLORIDE 1250 MG: 500 INJECTION, POWDER, LYOPHILIZED, FOR SOLUTION INTRAVENOUS at 03:19

## 2017-10-15 RX ADMIN — CLOTRIMAZOLE: 10 CREAM TOPICAL at 20:41

## 2017-10-15 RX ADMIN — TRAMADOL HYDROCHLORIDE 100 MG: 50 TABLET, FILM COATED ORAL at 16:57

## 2017-10-15 RX ADMIN — ACETAMINOPHEN 650 MG: 325 TABLET, FILM COATED ORAL at 05:10

## 2017-10-15 RX ADMIN — GABAPENTIN 300 MG: 300 CAPSULE ORAL at 20:40

## 2017-10-15 RX ADMIN — MORPHINE SULFATE 2 MG: 10 INJECTION, SOLUTION INTRAMUSCULAR; INTRAVENOUS at 15:27

## 2017-10-15 RX ADMIN — ENOXAPARIN SODIUM 30 MG: 30 INJECTION SUBCUTANEOUS at 20:39

## 2017-10-15 RX ADMIN — ONDANSETRON 4 MG: 2 INJECTION INTRAMUSCULAR; INTRAVENOUS at 08:52

## 2017-10-15 RX ADMIN — SODIUM CHLORIDE 75 ML/HR: 9 INJECTION, SOLUTION INTRAVENOUS at 12:18

## 2017-10-15 RX ADMIN — FAMOTIDINE 20 MG: 20 TABLET ORAL at 20:40

## 2017-10-15 RX ADMIN — ASPIRIN 325 MG: 325 TABLET, COATED ORAL at 09:39

## 2017-10-15 RX ADMIN — ONDANSETRON 4 MG: 2 INJECTION INTRAMUSCULAR; INTRAVENOUS at 16:57

## 2017-10-15 RX ADMIN — MAGNESIUM HYDROXIDE 30 ML: 400 SUSPENSION ORAL at 13:09

## 2017-10-15 RX ADMIN — ACETAMINOPHEN 650 MG: 325 TABLET, FILM COATED ORAL at 18:53

## 2017-10-15 RX ADMIN — TRAMADOL HYDROCHLORIDE 100 MG: 50 TABLET, FILM COATED ORAL at 09:39

## 2017-10-15 RX ADMIN — VANCOMYCIN HYDROCHLORIDE 1250 MG: 500 INJECTION, POWDER, LYOPHILIZED, FOR SOLUTION INTRAVENOUS at 13:07

## 2017-10-15 RX ADMIN — ENOXAPARIN SODIUM 30 MG: 30 INJECTION SUBCUTANEOUS at 09:38

## 2017-10-15 ASSESSMENT — PAIN DESCRIPTION - DESCRIPTORS
DESCRIPTORS: BURNING
DESCRIPTORS: HEADACHE
DESCRIPTORS: ACHING;BURNING
DESCRIPTORS: ACHING;BURNING
DESCRIPTORS: HEADACHE
DESCRIPTORS: ACHING;BURNING
DESCRIPTORS: HEADACHE
DESCRIPTORS: HEADACHE

## 2017-10-15 ASSESSMENT — PAIN DESCRIPTION - LOCATION
LOCATION: FOOT
LOCATION: FOOT
LOCATION: HEAD
LOCATION: FOOT
LOCATION: HEAD
LOCATION: FOOT
LOCATION: HEAD
LOCATION: HEAD

## 2017-10-15 ASSESSMENT — PAIN DESCRIPTION - FREQUENCY
FREQUENCY: CONTINUOUS
FREQUENCY: INTERMITTENT
FREQUENCY: CONTINUOUS
FREQUENCY: CONTINUOUS

## 2017-10-15 ASSESSMENT — PAIN DESCRIPTION - PAIN TYPE
TYPE: ACUTE PAIN

## 2017-10-15 ASSESSMENT — PAIN SCALES - GENERAL
PAINLEVEL_OUTOF10: 10
PAINLEVEL_OUTOF10: 7
PAINLEVEL_OUTOF10: 0
PAINLEVEL_OUTOF10: 3
PAINLEVEL_OUTOF10: 4
PAINLEVEL_OUTOF10: 6
PAINLEVEL_OUTOF10: 7
PAINLEVEL_OUTOF10: 2
PAINLEVEL_OUTOF10: 0
PAINLEVEL_OUTOF10: 2
PAINLEVEL_OUTOF10: 9
PAINLEVEL_OUTOF10: 4
PAINLEVEL_OUTOF10: 9
PAINLEVEL_OUTOF10: 10
PAINLEVEL_OUTOF10: 7
PAINLEVEL_OUTOF10: 6

## 2017-10-15 ASSESSMENT — PAIN DESCRIPTION - ORIENTATION
ORIENTATION: RIGHT;LEFT
ORIENTATION: LEFT
ORIENTATION: RIGHT;LEFT
ORIENTATION: LEFT
ORIENTATION: POSTERIOR

## 2017-10-15 ASSESSMENT — PAIN DESCRIPTION - ONSET
ONSET: ON-GOING
ONSET: ON-GOING

## 2017-10-15 ASSESSMENT — PAIN DESCRIPTION - PROGRESSION: CLINICAL_PROGRESSION: RAPIDLY WORSENING

## 2017-10-15 NOTE — PROGRESS NOTES
Progress Note    SUBJECTIVE:  Patient seen for cellulitis LLE. He says it is still painful and red  ROS:   Constitutional: negative  for fevers, and negative for chills. Respiratory: negative for shortness of breath, negative for cough, and negative for wheezing  Cardiovascular: negative for chest pain, and negative for palpitations  Gastrointestinal: negative for abdominal pain, negative for nausea,negative for vomiting, negative for diarrhea, and negative for constipation     All other systems were reviewed with the patient and are negative unless otherwise stated in HPI    OBJECTIVE:    EXAM:  Vitals:    10/15/17 0740   BP: (!) 148/68   Pulse: 77   Resp: 16   Temp: 97.6 °F (36.4 °C)   SpO2: 93%      Weight: (!) 304 lb 1.6 oz (137.9 kg)    Height: 5' 11\" (180.3 cm)     GEN:   A & O x3, no apparent distress  EYES: No gross abnormalities.   NECK: normal, supple, no lymphadenopathy,   PULM: clear to auscultation bilaterally- no wheezes, rales or rhonchi, normal air movement, no respiratory distress  COR: regular rate & rhythm, no murmurs and no gallops  ABD:  soft, non-tender, non-distended, normal bowel sounds, no masses or organomegaly  EXT:   Still has erythema, warmth pain left calf and foot  NEURO: negative  SKIN:  See above    microbiology: blood culture: negative and wound culture: pending    CBC with Differential:    Lab Results   Component Value Date    WBC 7.2 10/15/2017    RBC 4.54 10/15/2017    HGB 14.1 10/15/2017    HCT 41.9 10/15/2017     10/15/2017    MCV 92.4 10/15/2017    MCH 31.1 10/15/2017    MCHC 33.6 10/15/2017    RDW 13.8 10/15/2017    LYMPHOPCT 4 10/12/2017    MONOPCT 0 10/12/2017    BASOPCT 1 10/12/2017    MONOSABS 0.00 10/12/2017    LYMPHSABS 0.73 10/12/2017    EOSABS 0.18 10/12/2017    BASOSABS 0.18 10/12/2017    DIFFTYPE NOT REPORTED 10/12/2017           Vl Dup Lower Extremity Venous Left    Result Date: 10/13/2017    [facility]  Vascular Lower Extremities DVT Study +------------------------------------+----------+---------------+----------+ ! Location                            ! Visualized! Compressibility! Thrombosis! +------------------------------------+----------+---------------+----------+ ! Common Femoral                      !Yes       ! Yes            ! None      ! +------------------------------------+----------+---------------+----------+ ! Prox Femoral                        !Yes       ! Yes            ! None      ! +------------------------------------+----------+---------------+----------+ ! Mid Femoral                         !Yes       ! Yes            ! None      ! +------------------------------------+----------+---------------+----------+ ! Dist Femoral                        !Yes       ! Yes            ! None      ! +------------------------------------+----------+---------------+----------+ ! Deep Femoral                        !Yes       ! Yes            ! None      ! +------------------------------------+----------+---------------+----------+ ! Popliteal                           !Yes       ! Yes            ! None      ! +------------------------------------+----------+---------------+----------+ ! Sapheno Femoral Junction            ! Yes       ! Yes            ! None      ! +------------------------------------+----------+---------------+----------+ ! PTV                                 ! Yes       ! Yes            ! None      ! +------------------------------------+----------+---------------+----------+ ! Peroneal                            !Yes       ! Yes            ! None      ! +------------------------------------+----------+---------------+----------+ ! Gastroc                             ! Yes       ! Yes            ! None      ! +------------------------------------+----------+---------------+----------+ ! GSV Thigh                           ! Yes       ! Yes            ! None      ! +------------------------------------+----------+---------------+----------+ ! GSV Knee !Yes       !Yes            ! None      ! +------------------------------------+----------+---------------+----------+ ! GSV Ankle                           ! Yes       ! Yes            ! None      ! +------------------------------------+----------+---------------+----------+ ! SSV                                 ! Yes       ! Yes            ! None      ! +------------------------------------+----------+---------------+----------+ Left Doppler Measurements +-----------------------------+------+------+------------------------------+ ! Location                     ! Signal!Reflux! Reflux (sec)                  ! +-----------------------------+------+------+------------------------------+ ! Common Femoral               !Phasic! No    !                              ! +-----------------------------+------+------+------------------------------+ ! Prox Femoral                 !Phasic! No    !                              ! +-----------------------------+------+------+------------------------------+ ! Popliteal                    !Phasic! No    !                              ! +-----------------------------+------+------+------------------------------+  Conclusions   Summary   No evidence of superficial or deep venous thrombosis in the left lower  extremity.    Signature   ----------------------------------------------------------------  Electronically signed by MALOU Martinez(Sonographer) on  10/13/2017 09:15 AM  ----------------------------------------------------------------   ----------------------------------------------------------------  Electronically signed by Donna BurkettInterpreting  physician) on 10/13/2017 01:34 PM  ----------------------------------------------------------------        ASSESSMENT:  Cellulitis, slow to respond            Hospital Problems:  Principal Problem:    Cellulitis of left lower leg  Active Problems:    Tinea pedis of both feet    Neuropathy     Essential hypertension Headache      All Problems:  Patient Active Problem List   Diagnosis    Tinea pedis of both feet    Neuropathy     Cellulitis of left lower leg    Essential hypertension    Headache       PLAN:  · Continue IV vanco    HIGH RISK MEDS: Earl Palomino M.D.

## 2017-10-15 NOTE — PLAN OF CARE
Problem: Pain:  Goal: Pain level will decrease  Pain level will decrease   Outcome: Ongoing  Assess patients pain every four hours and as needed using the 1-10 pain scale. Patient encouraged to reposition every two hours and as needed. Medications as prescribed by physician. Alternatives to pain medications provided as tolerated. Will continue to monitor. Problem: Falls - Risk of  Goal: Absence of falls  Outcome: Ongoing  Patient is alert and oriented and has demonstrated the use of using the call light for assistance before getting up. Education has been provided to defer the use of the bed alarm and/or restraint free alarm as the patient has shown competency in calling for assistance and verbalizing the risk. Falling star program in use with fall band in place. Non skid socks are worn by patient. Call light, and bed side table along with personal belongings are within reach of patient. Will continue to monitor. Problem: DAILY CARE  Goal: Daily care needs are met  Outcome: Ongoing  Daily care needs are met by patient independently. Patient knows to call for assistance when needed. Will continue to monitor. Problem: SKIN INTEGRITY  Goal: Skin integrity is maintained or improved  Outcome: Ongoing  Patient independent in repositioning. Skin kept clean and dry and is intact at this time. Monitoring for skin changes every shift and as needed. No new open areas on skin or in mucous membranes noted at this time, will continue to monitor. Problem: Body Temperature - Imbalanced:  Goal: Ability to maintain a body temperature in the normal range will improve  Ability to maintain a body temperature in the normal range will improve   Outcome: Ongoing  Patient remains afebrile at this time. Will continue to monitor. Problem: Mobility - Impaired:  Goal: Mobility will improve to maximum level  Mobility will improve to maximum level   Outcome: Ongoing  Patient is independent within room and hallways.  Will continue

## 2017-10-15 NOTE — PROGRESS NOTES
Zofran given d/t c/o nausea. Patient experiencing green emesis while medication being given. Will continue to monitor.

## 2017-10-16 VITALS
RESPIRATION RATE: 16 BRPM | HEIGHT: 71 IN | BODY MASS INDEX: 42.56 KG/M2 | HEART RATE: 81 BPM | WEIGHT: 304 LBS | SYSTOLIC BLOOD PRESSURE: 145 MMHG | TEMPERATURE: 97.8 F | OXYGEN SATURATION: 92 % | DIASTOLIC BLOOD PRESSURE: 89 MMHG

## 2017-10-16 LAB
ANION GAP SERPL CALCULATED.3IONS-SCNC: 10 MMOL/L (ref 9–17)
BUN BLDV-MCNC: 11 MG/DL (ref 6–20)
BUN/CREAT BLD: 13 (ref 9–20)
CALCIUM SERPL-MCNC: 8.2 MG/DL (ref 8.6–10.4)
CHLORIDE BLD-SCNC: 98 MMOL/L (ref 98–107)
CO2: 27 MMOL/L (ref 20–31)
CREAT SERPL-MCNC: 0.86 MG/DL (ref 0.7–1.2)
GFR AFRICAN AMERICAN: >60 ML/MIN
GFR NON-AFRICAN AMERICAN: >60 ML/MIN
GFR SERPL CREATININE-BSD FRML MDRD: ABNORMAL ML/MIN/{1.73_M2}
GFR SERPL CREATININE-BSD FRML MDRD: ABNORMAL ML/MIN/{1.73_M2}
GLUCOSE BLD-MCNC: 107 MG/DL (ref 74–100)
GLUCOSE BLD-MCNC: 111 MG/DL (ref 70–99)
HCT VFR BLD CALC: 40.3 % (ref 41–53)
HEMOGLOBIN: 13.8 G/DL (ref 13.5–17)
MCH RBC QN AUTO: 31.1 PG (ref 26–34)
MCHC RBC AUTO-ENTMCNC: 34.2 G/DL (ref 31–37)
MCV RBC AUTO: 91.1 FL (ref 80–100)
PDW BLD-RTO: 13.8 % (ref 12.1–15.2)
PLATELET # BLD: 169 K/UL (ref 140–450)
PMV BLD AUTO: 8.3 FL (ref 6–12)
POTASSIUM SERPL-SCNC: 4.3 MMOL/L (ref 3.7–5.3)
RBC # BLD: 4.43 M/UL (ref 4.5–5.9)
SODIUM BLD-SCNC: 135 MMOL/L (ref 135–144)
WBC # BLD: 5.9 K/UL (ref 3.5–11)

## 2017-10-16 PROCEDURE — 36415 COLL VENOUS BLD VENIPUNCTURE: CPT

## 2017-10-16 PROCEDURE — 6370000000 HC RX 637 (ALT 250 FOR IP): Performed by: FAMILY MEDICINE

## 2017-10-16 PROCEDURE — 82947 ASSAY GLUCOSE BLOOD QUANT: CPT

## 2017-10-16 PROCEDURE — 85027 COMPLETE CBC AUTOMATED: CPT

## 2017-10-16 PROCEDURE — 2580000003 HC RX 258: Performed by: FAMILY MEDICINE

## 2017-10-16 PROCEDURE — 6360000002 HC RX W HCPCS: Performed by: FAMILY MEDICINE

## 2017-10-16 PROCEDURE — 6370000000 HC RX 637 (ALT 250 FOR IP): Performed by: PHYSICIAN ASSISTANT

## 2017-10-16 PROCEDURE — 6360000002 HC RX W HCPCS: Performed by: PHYSICIAN ASSISTANT

## 2017-10-16 PROCEDURE — 80048 BASIC METABOLIC PNL TOTAL CA: CPT

## 2017-10-16 RX ORDER — TRAMADOL HYDROCHLORIDE 50 MG/1
50 TABLET ORAL EVERY 6 HOURS PRN
Qty: 20 TABLET | Refills: 0 | Status: SHIPPED | OUTPATIENT
Start: 2017-10-16 | End: 2017-10-21

## 2017-10-16 RX ORDER — CLOTRIMAZOLE 1 %
CREAM (GRAM) TOPICAL
Qty: 1 TUBE | Refills: 1 | Status: SHIPPED | OUTPATIENT
Start: 2017-10-16 | End: 2017-10-23

## 2017-10-16 RX ORDER — FAMOTIDINE 20 MG/1
20 TABLET, FILM COATED ORAL 2 TIMES DAILY
Qty: 60 TABLET | Refills: 3 | Status: SHIPPED | OUTPATIENT
Start: 2017-10-16

## 2017-10-16 RX ORDER — CLINDAMYCIN HYDROCHLORIDE 300 MG/1
300 CAPSULE ORAL 3 TIMES DAILY
Qty: 15 CAPSULE | Refills: 0 | Status: SHIPPED | OUTPATIENT
Start: 2017-10-16 | End: 2017-10-21

## 2017-10-16 RX ADMIN — LISINOPRIL 20 MG: 20 TABLET ORAL at 09:12

## 2017-10-16 RX ADMIN — GABAPENTIN 300 MG: 300 CAPSULE ORAL at 09:12

## 2017-10-16 RX ADMIN — TRAMADOL HYDROCHLORIDE 100 MG: 50 TABLET, FILM COATED ORAL at 05:09

## 2017-10-16 RX ADMIN — CLOTRIMAZOLE: 10 CREAM TOPICAL at 09:14

## 2017-10-16 RX ADMIN — SODIUM CHLORIDE: 9 INJECTION, SOLUTION INTRAVENOUS at 02:43

## 2017-10-16 RX ADMIN — ACETAMINOPHEN 650 MG: 325 TABLET, FILM COATED ORAL at 02:42

## 2017-10-16 RX ADMIN — FAMOTIDINE 20 MG: 20 TABLET ORAL at 09:12

## 2017-10-16 RX ADMIN — ENOXAPARIN SODIUM 30 MG: 30 INJECTION SUBCUTANEOUS at 09:11

## 2017-10-16 RX ADMIN — VANCOMYCIN HYDROCHLORIDE 1250 MG: 500 INJECTION, POWDER, LYOPHILIZED, FOR SOLUTION INTRAVENOUS at 02:38

## 2017-10-16 ASSESSMENT — PAIN SCALES - GENERAL
PAINLEVEL_OUTOF10: 3
PAINLEVEL_OUTOF10: 6
PAINLEVEL_OUTOF10: 4
PAINLEVEL_OUTOF10: 5

## 2017-10-16 ASSESSMENT — PAIN DESCRIPTION - LOCATION
LOCATION: HEAD

## 2017-10-16 ASSESSMENT — PAIN DESCRIPTION - PAIN TYPE
TYPE: ACUTE PAIN

## 2017-10-16 ASSESSMENT — PAIN DESCRIPTION - ORIENTATION
ORIENTATION: POSTERIOR
ORIENTATION: POSTERIOR

## 2017-10-16 ASSESSMENT — PAIN DESCRIPTION - FREQUENCY
FREQUENCY: CONTINUOUS
FREQUENCY: CONTINUOUS

## 2017-10-16 ASSESSMENT — PAIN DESCRIPTION - DESCRIPTORS
DESCRIPTORS: HEADACHE

## 2017-10-16 NOTE — PROGRESS NOTES
Progress Note    SUBJECTIVE: Patient is seen for Principal Problem:    Cellulitis of left lower leg  Active Problems:    Tinea pedis of both feet    Neuropathy     Essential hypertension    Headache     has some nausea but no abdominal pain    OBJECTIVE:    Vitals:   Vitals:    10/16/17 0200   BP: (!) 141/61   Pulse: 88   Resp: 16   Temp: 98 °F (36.7 °C)   SpO2: 95%     Weight: (!) 304 lb (137.9 kg)   Height: 5' 11\" (180.3 cm)   -----------------------------------------------------------------  Exam:    CONSTITUTIONAL:  awake, alert, cooperative, no apparent distress, and appears stated age  EYES:  Lids and lashes normal, pupils equal, round and reactive to light, extra ocular muscles intact, sclera clear, conjunctiva normal  ENT:  Normocephalic, without obvious abnormality, atraumatic, sinuses nontender on palpation, external ears without lesions, oral pharynx with moist mucus membranes, tonsils without erythema or exudates, gums normal and good dentition. NECK:  Supple, symmetrical, trachea midline, no adenopathy, thyroid symmetric, not enlarged and no tenderness, skin normal  HEMATOLOGIC/LYMPHATICS:  no cervical lymphadenopathy  LUNGS:  No increased work of breathing, good air exchange, clear to auscultation bilaterally, no crackles or wheezing  CARDIOVASCULAR:  Normal apical impulse, regular rate and rhythm, normal S1 and S2, no S3 or S4, and no murmur noted  ABDOMEN:  No scars, normal bowel sounds, soft, non-distended, non-tender, no masses palpated, no hepatosplenomegally    MUSCULOSKELETAL:  There is no redness, warmth, or swelling of the joints. Full range of motion noted. Motor strength is 5 out of 5 all extremities bilaterally. Tone is normal.  NEUROLOGIC:  Awake, alert, oriented to name, place and time. Cranial nerves II-XII are grossly intact. Motor is 5 out of 5 bilaterally. Cerebellar finger to nose, heel to shin intact. Sensory is intact.   Babinski down going, Romberg negative, and gait is normal.  SKIN:  Cellulitis lt leg improving,tinea infection of both feet better  EXT:     no cyanosis, clubbing or edema present    -----------------------------------------------------------------  Diagnostic Data: Reviewed    More Recent Labs:    Results for orders placed or performed during the hospital encounter of 10/12/17   Culture Blood #1   Result Value Ref Range    Specimen Description . BLOOD     Special Requests RAC 20ML     Culture NO GROWTH 3 DAYS     Culture       Performed at [address] [place] [address]. [address]    Culture  ([phone]     Status Pending    Culture Blood #1   Result Value Ref Range    Specimen Description . BLOOD     Special Requests LAC 20ML     Culture NO GROWTH 3 DAYS     Culture       Performed at [address] [place] [address]. [address]    Culture  [phone]     Status Pending    Rapid influenza A/B antigens   Result Value Ref Range    Specimen Description . NASOPHARYNGEAL SWAB     Special Requests NOT REPORTED     Direct Exam       Presumptive negative for the presence of Influenza A and Influenza B antigen. Direct Exam        PCR confirmation of negative results is recommended, since the antigen present    Direct Exam        in the specimen may be below the detection limit of the test.    Direct Exam       Performed at [address] [place] [address]. [facility], [address] [place] [address]    Direct Exam  ([phone]     Status FINAL 10/13/2017    Anaerobic and Aerobic Culture   Result Value Ref Range    Specimen Description       . FOOT Performed at [address] [place] [address] [address]    Specimen Description  [id number]  [phone]     Special Requests       LT FOOT Performed at [address] [place] [address] [address]    Special Requests  [id number]  [phone]     Direct Exam (A)      This specimen contains squamous epithelial cells and therefore is suboptimal for    Direct Exam (A) anaerobic culture as it is contaminated with surface material.  Poor specimen    Direct Exam (A)       quality contributes to misdiagnosis and inappropriate antimicrobial therapy.     Direct Exam RARE NEUTROPHILS (A)     Direct Exam (A)      FEW GRAM POSITIVE COCCI SINGLY, IN PAIRS AND IN CLUSTERS    Culture NORMAL LUKE     Culture STREPTOCOCCI, BETA HEMOLYTIC GROUP B LIGHT GROWTH (A)     Culture NO ANAEROBIC ORGANISMS ISOLATED AT 2 DAYS (A)     Culture       Performed at [address] [place] [address] [phone]    Status Pending    CBC Auto Differential   Result Value Ref Range    WBC 18.3 (H) 3.5 - 11.0 k/uL    RBC 4.93 4.5 - 5.9 m/uL    Hemoglobin 15.3 13.5 - 17.0 g/dL    Hematocrit 45.5 41 - 53 %    MCV 92.2 80 - 100 fL    MCH 31.0 26 - 34 pg    MCHC 33.6 31 - 37 g/dL    RDW 13.9 12.1 - 15.2 %    Platelets 617 018 - 958 k/uL    MPV 8.7 6.0 - 12.0 fL    Differential Type NOT REPORTED     WBC Morphology NOT REPORTED     RBC Morphology NOT REPORTED     Platelet Estimate NOT REPORTED     Seg Neutrophils 86 %    Lymphocytes 4 %    Monocytes 0 %    Eosinophils % 1 %    Basophils 1 %    Bands 8 %    Segs Absolute 15.75 (H) 1.8 - 7.7 k/uL    Absolute Lymph # 0.73 (L) 1.0 - 4.8 k/uL    Absolute Mono # 0.00 0.0 - 1.0 k/uL    Absolute Eos # 0.18 0.0 - 0.4 k/uL    Basophils # 0.18 0.0 - 0.2 k/uL    Absolute Bands # 1.46 (H) 0.0 - 1.0 k/uL    Morphology Normal    Comprehensive Metabolic Panel   Result Value Ref Range    Glucose 142 (H) 70 - 99 mg/dL    BUN 14 6 - 20 mg/dL    CREATININE 1.02 0.70 - 1.20 mg/dL    Bun/Cre Ratio 14 9 - 20    Calcium 9.0 8.6 - 10.4 mg/dL    Sodium 138 135 - 144 mmol/L    Potassium 4.3 3.7 - 5.3 mmol/L    Chloride 99 98 - 107 mmol/L    CO2 25 20 - 31 mmol/L    Anion Gap 14 9 - 17 mmol/L    Alkaline Phosphatase 63 40 - 129 U/L    ALT 48 (H) 5 - 41 U/L    AST 29 <40 U/L    Total Bilirubin 0.43 0.3 - 1.2 mg/dL    Total Protein 7.0 6.4 - 8.3 g/dL    Alb 4.2 3.5 - 5.2

## 2017-10-16 NOTE — DISCHARGE SUMMARY
improved. No warmth or redness  Bilateral feet - distal - moist - red - fungal in appearance ( improved from prior)  NEURO: follows commands, CARRILLO, slight diminished sensation bilateral feet. SKIN:  See ext    Significant Diagnostic Studies:   Lab Results   Component Value Date    WBC 5.9 10/16/2017    HGB 13.8 10/16/2017     10/16/2017       Lab Results   Component Value Date    BUN 11 10/16/2017    CREATININE 0.86 10/16/2017     10/16/2017    K 4.3 10/16/2017    CALCIUM 8.2 (L) 10/16/2017    CL 98 10/16/2017    CO2 27 10/16/2017    LABGLOM >60 10/16/2017       Lab Results   Component Value Date    WBCUA 0 TO 2 10/13/2017    RBCUA 0 TO 2 10/13/2017    EPITHUA None 10/13/2017    LEUKOCYTESUR NEGATIVE 10/13/2017    SPECGRAV 1.015 10/13/2017    GLUCOSEU NEGATIVE 10/13/2017    KETUA NEGATIVE 10/13/2017    PROTEINU NEGATIVE 10/13/2017    HGBUR NEGATIVE 10/13/2017    CASTUA NOT REPORTED 10/13/2017    CRYSTUA NOT REPORTED 10/13/2017    BACTERIA NOT REPORTED 10/13/2017    YEAST NOT REPORTED 10/13/2017       Vl Dup Lower Extremity Venous Left    Result Date: 10/13/2017    [facility]  Vascular Lower Extremities DVT Study Procedure   Patient Name   Rupali Hussein Date of Study           10/13/2017   Date of Birth  1960   Gender                  Male   Age            61 year(s)   Race                       Room Number    6623   Corporate ID # [id number]   Patient Acct # [de-identified]   MR #           [id number]       [address] [place]   Accession #    [id number]  Interpreting Physician  Michael Gallegos   Referring                   Referring Physician     Hrial Riggins  Nurse  Practitioner  Procedure Type of Study:   Veins: Lower Extremities DVT Study, Venous Scan Lower Left. Indications for Study:Pain, leg. Left   The common femoral, superficial and deep femoral, popliteal, tibials,   peroneal, and saphenous veins were evaluated.    All veins were compressible with normal doppler responses. Patient Status: In Patient. Technical Quality:Adequate visualization. Velocities are measured in cm/s ; Diameters are measured in mm Right Lower Extremities DVT Study Measurements Right 2D Measurements +------------------------------------+----------+---------------+----------+ ! Location                            ! Visualized! Compressibility! Thrombosis! +------------------------------------+----------+---------------+----------+ ! Common Femoral                      !Yes       ! Yes            ! None      ! +------------------------------------+----------+---------------+----------+ Right Doppler Measurements +------------------------------+------+------+-----------------------------+ ! Location                      ! Signal!Reflux! Reflux (sec)                 ! +------------------------------+------+------+-----------------------------+ ! Common Femoral                !Phasic!      !                             ! +------------------------------+------+------+-----------------------------+ Left Lower Extremities DVT Study Measurements Left 2D Measurements +------------------------------------+----------+---------------+----------+ ! Location                            ! Visualized! Compressibility! Thrombosis! +------------------------------------+----------+---------------+----------+ ! Common Femoral                      !Yes       ! Yes            ! None      ! +------------------------------------+----------+---------------+----------+ ! Prox Femoral                        !Yes       ! Yes            ! None      ! +------------------------------------+----------+---------------+----------+ ! Mid Femoral                         !Yes       ! Yes            ! None      ! +------------------------------------+----------+---------------+----------+ ! Dist Femoral                        !Yes       ! Yes            ! None      ! +------------------------------------+----------+---------------+----------+ ! Deep Femoral                        !Yes       ! Yes            ! None      ! +------------------------------------+----------+---------------+----------+ ! Popliteal                           !Yes       ! Yes            ! None      ! +------------------------------------+----------+---------------+----------+ ! Sapheno Femoral Junction            ! Yes       ! Yes            ! None      ! +------------------------------------+----------+---------------+----------+ ! PTV                                 ! Yes       ! Yes            ! None      ! +------------------------------------+----------+---------------+----------+ ! Peroneal                            !Yes       ! Yes            ! None      ! +------------------------------------+----------+---------------+----------+ ! Gastroc                             ! Yes       ! Yes            ! None      ! +------------------------------------+----------+---------------+----------+ ! GSV Thigh                           ! Yes       ! Yes            ! None      ! +------------------------------------+----------+---------------+----------+ ! GSV Knee                            ! Yes       ! Yes            ! None      ! +------------------------------------+----------+---------------+----------+ ! GSV Ankle                           ! Yes       ! Yes            ! None      ! +------------------------------------+----------+---------------+----------+ ! SSV                                 ! Yes       ! Yes            ! None      ! +------------------------------------+----------+---------------+----------+ Left Doppler Measurements +-----------------------------+------+------+------------------------------+ ! Location                     ! Signal!Reflux! Reflux (sec)                  ! +-----------------------------+------+------+------------------------------+ ! Common Femoral               !Phasic! No    !                              ! +-----------------------------+------+------+------------------------------+ !Prox Femoral                 !Phasic! No    !                              ! +-----------------------------+------+------+------------------------------+ ! Popliteal                    !Phasic! No    !                              ! +-----------------------------+------+------+------------------------------+  Conclusions   Summary   No evidence of superficial or deep venous thrombosis in the left lower  extremity. Signature   ----------------------------------------------------------------  Electronically signed by MALOU Sullivan(Sonographer) on  10/13/2017 09:15 AM  ----------------------------------------------------------------   ----------------------------------------------------------------  Electronically signed by Jacobo BurkettInterpreting  physician) on 10/13/2017 01:34 PM  ----------------------------------------------------------------        Discharge Diagnoses:    Principal Problem:    Cellulitis of left lower leg  Active Problems:    Tinea pedis of both feet    Neuropathy     Essential hypertension    Headache      Active Hospital Problems    Diagnosis Date Noted    Tinea pedis of both feet [B35.3] 10/13/2017    Neuropathy  [G62.9] 10/13/2017    Cellulitis of left lower leg [L03.116] 10/13/2017    Essential hypertension [I10] 10/13/2017    Headache [R51] 10/13/2017       Discharge Medications:       Aaron Mckeon   Home Medication Instructions [facility]:[id number]    Printed on:10/16/17 1031   Medication Information                      aspirin 81 MG tablet  Take 1 tablet by mouth daily             clindamycin (CLEOCIN) 300 MG capsule  Take 1 capsule by mouth 3 times daily for 5 days             clotrimazole (LOTRIMIN) 1 % cream  Apply topically 2 times daily.              diclofenac sodium 1 % GEL  Apply 2 g topically 4 times daily as needed for Pain             famotidine (PEPCID) 20 MG tablet  Take 1 tablet by mouth 2 times daily             gabapentin (NEURONTIN) 300 MG capsule  Take 300

## 2017-10-17 LAB — LYME ANTIBODY: 0.16

## 2017-10-18 LAB
CULTURE: ABNORMAL
CULTURE: NORMAL
DIRECT EXAM: ABNORMAL
Lab: ABNORMAL
Lab: ABNORMAL
Lab: NORMAL
Lab: NORMAL
SPECIMEN DESCRIPTION: ABNORMAL
SPECIMEN DESCRIPTION: ABNORMAL
SPECIMEN DESCRIPTION: NORMAL
SPECIMEN DESCRIPTION: NORMAL
STATUS: ABNORMAL
STATUS: NORMAL
STATUS: NORMAL

## 2017-11-10 ENCOUNTER — HOSPITAL ENCOUNTER (OUTPATIENT)
Age: 57
Discharge: HOME | End: 2017-11-10
Payer: COMMERCIAL

## 2017-11-10 LAB
RHEUMATOID FACTOR: 10.3 IU/ML
SEDIMENTATION RATE, ERYTHROCYTE: 7 MM (ref 0–20)

## 2017-11-10 PROCEDURE — 85651 RBC SED RATE NONAUTOMATED: CPT

## 2017-11-10 PROCEDURE — 86431 RHEUMATOID FACTOR QUANT: CPT

## 2017-11-10 PROCEDURE — 36415 COLL VENOUS BLD VENIPUNCTURE: CPT

## 2017-11-13 ENCOUNTER — HOSPITAL ENCOUNTER (OUTPATIENT)
Dept: SLEEP CENTER | Age: 57
Discharge: HOME | End: 2017-11-13
Payer: COMMERCIAL

## 2017-11-13 PROCEDURE — 95806 SLEEP STUDY UNATT&RESP EFFT: CPT

## 2017-11-13 ASSESSMENT — SLEEP AND FATIGUE QUESTIONNAIRES
HOW LIKELY ARE YOU TO NOD OFF OR FALL ASLEEP WHILE SITTING INACTIVE IN A PUBLIC PLACE: 2
HOW LIKELY ARE YOU TO NOD OFF OR FALL ASLEEP WHILE WATCHING TV: 3
HOW LIKELY ARE YOU TO NOD OFF OR FALL ASLEEP WHILE SITTING AND TALKING TO SOMEONE: 0
HOW LIKELY ARE YOU TO NOD OFF OR FALL ASLEEP IN A CAR, WHILE STOPPED FOR A FEW MINUTES IN TRAFFIC: 0
HOW LIKELY ARE YOU TO NOD OFF OR FALL ASLEEP WHILE SITTING QUIETLY AFTER LUNCH WITHOUT ALCOHOL: 2
HOW LIKELY ARE YOU TO NOD OFF OR FALL ASLEEP WHILE SITTING AND READING: 2
HOW LIKELY ARE YOU TO NOD OFF OR FALL ASLEEP WHILE LYING DOWN TO REST IN THE AFTERNOON WHEN CIRCUMSTANCES PERMIT: 3
HOW LIKELY ARE YOU TO NOD OFF OR FALL ASLEEP WHEN YOU ARE A PASSENGER IN A CAR FOR AN HOUR WITHOUT A BREAK: 2
ESS TOTAL SCORE: 14

## 2017-11-22 NOTE — PROGRESS NOTES
[address] [facility], [address] [facility]                                    SLEEP STUDY    PATIENT NAME: Ladonna Stokes                       :        1960  MED REC NO:   [id number]                              ROOM:  ACCOUNT NO:   [de-identified]                           ADMIT DATE: 2017  PROVIDER:     Sophie Clarke    [facility]  INTERPRETATION OF CLINICAL POLYSOMNOGRAPHY    DATE OF STUDY:  2017    REFERRING PROVIDER:  Jn Espana NP    CLINICAL IMPRESSION:  1. Obstructive sleep apnea syndrome. 2.  Hypertension. PROCEDURE STANDARD:  It was a one-night ApneaLink home study. PROCEDURE:  The sleep/wake evaluation consisted of an intensive intake  interview, 1 night of home sleep testing. The standard montage for Home Sleep Testing included the [address] [address]. The respiratory battery consisted of measurements of simultaneous recording  of ventilation (oronasal thermal airflow), respiratory effort (single  thoracoabdominal piezo belt), ECG or heart rate, oxygen saturation (finger  oximetry). Polysomnography revealed that the sleep study was started at 07:40 p.m. and  finished at 5:11 a.m. We had a total recording time of 9 hours and 31  minutes and total monitoring time of 9 hours and 12 minutes. Sleep  efficiency was normal.    The respiratory evaluation revealed that the patient had apnea-hypopnea  index of 58 per hour. Lowest oxygen saturation during the night was 75%. IMPRESSION:  1. Obstructive sleep apnea syndrome, severe. 2.  Hypertension. RECOMMENDATION:  The patient has severe degree of sleep apnea syndrome. It  is recommended that the patient should return to the sleep center for  treatment with nasal CPAP or BiPAP, thereby relieving the apnea should  improve his daytime symptoms and also protect the patient from the  morbidity associated with the apnea.     The patient should be encouraged to lose weight. The patient should be instructed on proper sleep hygiene techniques. Treatment of apnea will improve the outcome of hypertension.         Dari Auguste    D:11/21/2017 7:46:32               T: 11/22/2017 0:08:30     [email or web address]  Job#: [id number]     Doc#: [id number]

## 2017-11-28 ENCOUNTER — HOSPITAL ENCOUNTER (OUTPATIENT)
Dept: GENERAL RADIOLOGY | Age: 57
Discharge: HOME | End: 2017-11-28
Payer: COMMERCIAL

## 2017-11-28 ENCOUNTER — HOSPITAL ENCOUNTER (OUTPATIENT)
Age: 57
Discharge: HOME | End: 2017-11-28
Payer: COMMERCIAL

## 2017-11-28 ENCOUNTER — HOSPITAL ENCOUNTER (OUTPATIENT)
Dept: VASCULAR LAB | Age: 57
Discharge: HOME | End: 2017-11-28
Payer: COMMERCIAL

## 2017-11-28 DIAGNOSIS — G89.4 CHRONIC PAIN DISORDER: ICD-10-CM

## 2017-11-28 DIAGNOSIS — R52 PAIN: ICD-10-CM

## 2017-11-28 PROCEDURE — 72072 X-RAY EXAM THORAC SPINE 3VWS: CPT

## 2017-11-28 PROCEDURE — 73610 X-RAY EXAM OF ANKLE: CPT

## 2017-11-28 PROCEDURE — 72100 X-RAY EXAM L-S SPINE 2/3 VWS: CPT

## 2017-11-28 PROCEDURE — 72050 X-RAY EXAM NECK SPINE 4/5VWS: CPT

## 2017-11-28 PROCEDURE — 73521 X-RAY EXAM HIPS BI 2 VIEWS: CPT

## 2017-11-28 PROCEDURE — 93923 UPR/LXTR ART STDY 3+ LVLS: CPT

## 2017-11-28 PROCEDURE — 73562 X-RAY EXAM OF KNEE 3: CPT

## 2017-12-01 ENCOUNTER — HOSPITAL ENCOUNTER (OUTPATIENT)
Dept: VASCULAR LAB | Age: 57
Discharge: HOME | End: 2017-12-01
Payer: COMMERCIAL

## 2017-12-01 DIAGNOSIS — R09.89 BRUIT: ICD-10-CM

## 2017-12-01 PROCEDURE — 93880 EXTRACRANIAL BILAT STUDY: CPT

## 2017-12-05 ENCOUNTER — HOSPITAL ENCOUNTER (OUTPATIENT)
Dept: SLEEP CENTER | Age: 57
Discharge: HOME | End: 2017-12-05
Payer: COMMERCIAL

## 2017-12-05 PROCEDURE — 95811 POLYSOM 6/>YRS CPAP 4/> PARM: CPT

## 2017-12-05 ASSESSMENT — SLEEP AND FATIGUE QUESTIONNAIRES
HOW LIKELY ARE YOU TO NOD OFF OR FALL ASLEEP WHILE SITTING QUIETLY AFTER LUNCH WITHOUT ALCOHOL: 2
ESS TOTAL SCORE: 16
HOW LIKELY ARE YOU TO NOD OFF OR FALL ASLEEP IN A CAR, WHILE STOPPED FOR A FEW MINUTES IN TRAFFIC: 1
HOW LIKELY ARE YOU TO NOD OFF OR FALL ASLEEP WHEN YOU ARE A PASSENGER IN A CAR FOR AN HOUR WITHOUT A BREAK: 3
HOW LIKELY ARE YOU TO NOD OFF OR FALL ASLEEP WHILE SITTING AND TALKING TO SOMEONE: 0
NECK CIRCUMFERENCE (INCHES): 19.75
HOW LIKELY ARE YOU TO NOD OFF OR FALL ASLEEP WHILE WATCHING TV: 3
HOW LIKELY ARE YOU TO NOD OFF OR FALL ASLEEP WHILE SITTING AND READING: 2
HOW LIKELY ARE YOU TO NOD OFF OR FALL ASLEEP WHILE SITTING INACTIVE IN A PUBLIC PLACE: 2
HOW LIKELY ARE YOU TO NOD OFF OR FALL ASLEEP WHILE LYING DOWN TO REST IN THE AFTERNOON WHEN CIRCUMSTANCES PERMIT: 3

## 2017-12-12 NOTE — PROGRESS NOTES
[address] [address]                                    SLEEP STUDY    PATIENT NAME: Abran Kaufman                       :        1960  MED REC NO:   [id number]                              ROOM:  ACCOUNT NO:   [de-identified]                           ADMIT DATE: 2017  PROVIDER:     Williams Carrillo    SLEEP [facility]PROVEMENT [place]  INTERPRETATION OF CLINICAL POLYSOMNOGRAPHY    DATE OF STUDY:  2017    REFERRING PROVIDER:  Evelyn Cruz NP    CLINICAL IMPRESSION:  1. Obstructive sleep apnea syndrome. 2.  Hypertension. PROCEDURE STANDARD:  It was a 1-night CPAP titration. PROCEDURE:  The sleep/wake evaluation consisted of an intensive intake  interview, one night of clinical polysomnography, a nocturnal respiratory  battery, left and right leg anterior tibialis surface electromyography. The standard montage for clinical polysomnography included the  electroencephalogram, the electro-oculogram, mentalis surface  electromyography, and modified Lead II cardiography. The respiratory  battery consisted of measurements of oral/nasal airflow by heat thermistor,  nasal pressure transducer, thoracic and abdominal effort by Respiratory  Inductance Plethysmography. Nocturnal oxyhemoglobin saturations were  obtained by finger oximetry. Polysomnography revealed that the patient spent 481 minutes in bed with a  total sleep time of 457 minutes. Sleep efficiency was normal at 95%. Latency of sleep onset was normal at 10 minutes. There were 41 sleep stage  changes. There were 10 awakenings during the night. Sleep architecture was normal with 2% stage I, 71% stage II, 0% Delta, and  27% REM. Latency of various sleep stages were normal.    Respiratory evaluation revealed while on CPAP at a final pressure of 16 cm  of water, the patient had 8 partial obstructions.   The lowest oxygen  saturation in this setting

## 2018-01-04 ENCOUNTER — HOSPITAL ENCOUNTER (OUTPATIENT)
Dept: GENERAL RADIOLOGY | Age: 58
Discharge: HOME | End: 2018-01-04
Payer: COMMERCIAL

## 2018-01-04 ENCOUNTER — HOSPITAL ENCOUNTER (OUTPATIENT)
Dept: MRI IMAGING | Age: 58
Discharge: HOME | End: 2018-01-04
Payer: COMMERCIAL

## 2018-01-04 DIAGNOSIS — T15.92XA FOREIGN BODY OF LEFT EYE, INITIAL ENCOUNTER: ICD-10-CM

## 2018-01-04 DIAGNOSIS — M46.1 SACROILIITIS (HCC): ICD-10-CM

## 2018-01-04 DIAGNOSIS — M48.062 LUMBAR STENOSIS WITH NEUROGENIC CLAUDICATION: ICD-10-CM

## 2018-01-04 PROCEDURE — 72148 MRI LUMBAR SPINE W/O DYE: CPT

## 2018-01-04 PROCEDURE — 70030 X-RAY EYE FOR FOREIGN BODY: CPT

## 2018-01-19 ENCOUNTER — HOSPITAL ENCOUNTER (OUTPATIENT)
Dept: LAB | Age: 58
Discharge: HOME | End: 2018-01-19
Payer: COMMERCIAL

## 2018-01-19 ENCOUNTER — HOSPITAL ENCOUNTER (OUTPATIENT)
Dept: NON INVASIVE DIAGNOSTICS | Age: 58
Discharge: HOME | End: 2018-01-19
Payer: COMMERCIAL

## 2018-01-19 LAB
ABSOLUTE EOS #: 0.2 K/UL (ref 0–0.4)
ABSOLUTE IMMATURE GRANULOCYTE: NORMAL K/UL (ref 0–0.3)
ABSOLUTE LYMPH #: 1.3 K/UL (ref 1–4.8)
ABSOLUTE MONO #: 0.6 K/UL (ref 0–1)
ALBUMIN SERPL-MCNC: 4.2 G/DL (ref 3.5–5.2)
ALBUMIN/GLOBULIN RATIO: 1.5 (ref 1–2.5)
ALP BLD-CCNC: 54 U/L (ref 40–129)
ALT SERPL-CCNC: 46 U/L (ref 5–41)
ANION GAP SERPL CALCULATED.3IONS-SCNC: 12 MMOL/L (ref 9–17)
AST SERPL-CCNC: 29 U/L
BASOPHILS # BLD: 1 % (ref 0–2)
BASOPHILS ABSOLUTE: 0 K/UL (ref 0–0.2)
BILIRUB SERPL-MCNC: 0.41 MG/DL (ref 0.3–1.2)
BUN BLDV-MCNC: 13 MG/DL (ref 6–20)
BUN/CREAT BLD: 13 (ref 9–20)
CALCIUM SERPL-MCNC: 9 MG/DL (ref 8.6–10.4)
CHLORIDE BLD-SCNC: 102 MMOL/L (ref 98–107)
CHOLESTEROL/HDL RATIO: 6.3
CHOLESTEROL: 150 MG/DL
CO2: 27 MMOL/L (ref 20–31)
CREAT SERPL-MCNC: 1 MG/DL (ref 0.7–1.2)
CREATININE URINE: 97.1 MG/DL (ref 39–259)
DIFFERENTIAL TYPE: NORMAL
EOSINOPHILS RELATIVE PERCENT: 5 % (ref 0–8)
ESTIMATED AVERAGE GLUCOSE: 123 MG/DL
GFR AFRICAN AMERICAN: >60 ML/MIN
GFR NON-AFRICAN AMERICAN: >60 ML/MIN
GFR SERPL CREATININE-BSD FRML MDRD: ABNORMAL ML/MIN/{1.73_M2}
GFR SERPL CREATININE-BSD FRML MDRD: ABNORMAL ML/MIN/{1.73_M2}
GLUCOSE BLD-MCNC: 109 MG/DL (ref 70–99)
HBA1C MFR BLD: 5.9 % (ref 4.8–5.9)
HCT VFR BLD CALC: 41.8 % (ref 41–53)
HDLC SERPL-MCNC: 24 MG/DL
HEMOGLOBIN: 13.9 G/DL (ref 13.5–17)
IMMATURE GRANULOCYTES: NORMAL %
LDL CHOLESTEROL: 86 MG/DL (ref 0–130)
LV EF: 55 %
LVEF MODALITY: NORMAL
LYMPHOCYTES # BLD: 26 % (ref 24–44)
MCH RBC QN AUTO: 30.7 PG (ref 26–34)
MCHC RBC AUTO-ENTMCNC: 33.3 G/DL (ref 31–37)
MCV RBC AUTO: 92.4 FL (ref 80–100)
MICROALBUMIN/CREAT 24H UR: <12 MG/L
MICROALBUMIN/CREAT UR-RTO: NORMAL MCG/MG CREAT
MONOCYTES # BLD: 12 % (ref 0–12)
NRBC AUTOMATED: NORMAL PER 100 WBC
PDW BLD-RTO: 14.5 % (ref 12.1–15.2)
PLATELET # BLD: 206 K/UL (ref 140–450)
PLATELET ESTIMATE: NORMAL
PMV BLD AUTO: 7.7 FL (ref 6–12)
POTASSIUM SERPL-SCNC: 4.5 MMOL/L (ref 3.7–5.3)
PROSTATE SPECIFIC ANTIGEN: 0.35 UG/L
RBC # BLD: 4.53 M/UL (ref 4.5–5.9)
RBC # BLD: NORMAL 10*6/UL
SEG NEUTROPHILS: 56 % (ref 36–66)
SEGMENTED NEUTROPHILS ABSOLUTE COUNT: 2.9 K/UL (ref 1.8–7.7)
SODIUM BLD-SCNC: 141 MMOL/L (ref 135–144)
TOTAL PROTEIN: 7 G/DL (ref 6.4–8.3)
TRIGL SERPL-MCNC: 199 MG/DL
TSH SERPL DL<=0.05 MIU/L-ACNC: 4.52 MIU/L (ref 0.3–5)
VLDLC SERPL CALC-MCNC: ABNORMAL MG/DL (ref 1–30)
WBC # BLD: 5.1 K/UL (ref 3.5–11)
WBC # BLD: NORMAL 10*3/UL

## 2018-01-19 PROCEDURE — 80053 COMPREHEN METABOLIC PANEL: CPT

## 2018-01-19 PROCEDURE — 83036 HEMOGLOBIN GLYCOSYLATED A1C: CPT

## 2018-01-19 PROCEDURE — 84443 ASSAY THYROID STIM HORMONE: CPT

## 2018-01-19 PROCEDURE — G0103 PSA SCREENING: HCPCS

## 2018-01-19 PROCEDURE — 82570 ASSAY OF URINE CREATININE: CPT

## 2018-01-19 PROCEDURE — 82043 UR ALBUMIN QUANTITATIVE: CPT

## 2018-01-19 PROCEDURE — 36415 COLL VENOUS BLD VENIPUNCTURE: CPT

## 2018-01-19 PROCEDURE — 84207 ASSAY OF VITAMIN B-6: CPT

## 2018-01-19 PROCEDURE — 93306 TTE W/DOPPLER COMPLETE: CPT

## 2018-01-19 PROCEDURE — 80061 LIPID PANEL: CPT

## 2018-01-19 PROCEDURE — 85025 COMPLETE CBC W/AUTO DIFF WBC: CPT

## 2018-01-23 LAB — VITAMIN B6: 81.1 NMOL/L (ref 20–125)

## 2022-01-14 ENCOUNTER — HOSPITAL ENCOUNTER (OUTPATIENT)
Age: 62
Setting detail: SPECIMEN
Discharge: HOME | End: 2022-01-14

## 2022-08-08 ENCOUNTER — HOSPITAL ENCOUNTER (OUTPATIENT)
Dept: NON INVASIVE DIAGNOSTICS | Age: 62
Discharge: HOME | End: 2022-08-08
Payer: COMMERCIAL

## 2022-08-08 ENCOUNTER — HOSPITAL ENCOUNTER (OUTPATIENT)
Dept: ULTRASOUND IMAGING | Age: 62
Discharge: HOME | End: 2022-08-10
Payer: COMMERCIAL

## 2022-08-08 DIAGNOSIS — F10.20 ACUTE ALCOHOLISM (HCC): ICD-10-CM

## 2022-08-08 DIAGNOSIS — E66.01 MORBID (SEVERE) OBESITY DUE TO EXCESS CALORIES (HCC): ICD-10-CM

## 2022-08-08 DIAGNOSIS — I10 ESSENTIAL HYPERTENSION, MALIGNANT: ICD-10-CM

## 2022-08-08 DIAGNOSIS — R06.02 SHORTNESS OF BREATH: ICD-10-CM

## 2022-08-08 DIAGNOSIS — R74.8 ELEVATED LIVER ENZYMES: ICD-10-CM

## 2022-08-08 DIAGNOSIS — R07.89 CHEST TIGHTNESS: ICD-10-CM

## 2022-08-08 PROCEDURE — 93017 CV STRESS TEST TRACING ONLY: CPT

## 2022-08-08 PROCEDURE — 76705 ECHO EXAM OF ABDOMEN: CPT

## 2022-08-09 NOTE — PROCEDURES
significant ST segment changes suggestive of myocardial ischemia with  no premature atrial contractions (PACs) and no premature ventricular  contractions (PVCs). IMPRESSION:  No significant electrocardiographic evidence of myocardial ischemia  during EKG monitoring without significant associated arrhythmias only. T-wave inversion noted on aVL, V1 and V2, which is nonspecific and could be secondary to his baseline ECG abnormalities. The patient's Duke Treadmill score is 6, which correlates with a low  risk significant coronary artery disease. Depending on the patient's symptoms and level of clinical suspicion,  aggressive medical management versus additional testing by coronary  angiography may be indicated.         Phi Florian MD    D: 08/09/2022 10:51:34       T: 08/09/2022 10:53:16     [email or web address]  Job#: [id number]     Doc#: Unknown    CC:  MELI Kay